# Patient Record
Sex: MALE | Race: OTHER | HISPANIC OR LATINO | URBAN - METROPOLITAN AREA
[De-identification: names, ages, dates, MRNs, and addresses within clinical notes are randomized per-mention and may not be internally consistent; named-entity substitution may affect disease eponyms.]

---

## 2017-07-02 ENCOUNTER — INPATIENT (INPATIENT)
Facility: HOSPITAL | Age: 72
LOS: 2 days | Discharge: HOME | DRG: 309 | End: 2017-07-05
Attending: FAMILY MEDICINE | Admitting: HOSPITALIST
Payer: MEDICARE

## 2017-07-02 VITALS
DIASTOLIC BLOOD PRESSURE: 116 MMHG | OXYGEN SATURATION: 95 % | RESPIRATION RATE: 16 BRPM | TEMPERATURE: 98 F | SYSTOLIC BLOOD PRESSURE: 147 MMHG | HEART RATE: 130 BPM | WEIGHT: 173.94 LBS | HEIGHT: 64 IN

## 2017-07-02 LAB
ALBUMIN SERPL ELPH-MCNC: 4.2 G/DL — SIGNIFICANT CHANGE UP (ref 3.3–5)
ALP SERPL-CCNC: 73 U/L — SIGNIFICANT CHANGE UP (ref 40–120)
ALT FLD-CCNC: 31 U/L — SIGNIFICANT CHANGE UP (ref 12–78)
ANION GAP SERPL CALC-SCNC: 7 MMOL/L — SIGNIFICANT CHANGE UP (ref 5–17)
APTT BLD: 42.7 SEC — HIGH (ref 27.5–37.4)
AST SERPL-CCNC: 30 U/L — SIGNIFICANT CHANGE UP (ref 15–37)
BASOPHILS # BLD AUTO: 0.1 K/UL — SIGNIFICANT CHANGE UP (ref 0–0.2)
BASOPHILS NFR BLD AUTO: 1.1 % — SIGNIFICANT CHANGE UP (ref 0–2)
BILIRUB SERPL-MCNC: 0.7 MG/DL — SIGNIFICANT CHANGE UP (ref 0.2–1.2)
BUN SERPL-MCNC: 23 MG/DL — SIGNIFICANT CHANGE UP (ref 7–23)
CALCIUM SERPL-MCNC: 9.1 MG/DL — SIGNIFICANT CHANGE UP (ref 8.5–10.1)
CHLORIDE SERPL-SCNC: 100 MMOL/L — SIGNIFICANT CHANGE UP (ref 96–108)
CK MB BLD-MCNC: 2 % — SIGNIFICANT CHANGE UP (ref 0–3.5)
CK MB CFR SERPL CALC: 4.2 NG/ML — HIGH (ref 0–3.6)
CK SERPL-CCNC: 207 U/L — SIGNIFICANT CHANGE UP (ref 26–308)
CO2 SERPL-SCNC: 27 MMOL/L — SIGNIFICANT CHANGE UP (ref 22–31)
CREAT SERPL-MCNC: 0.92 MG/DL — SIGNIFICANT CHANGE UP (ref 0.5–1.3)
EOSINOPHIL # BLD AUTO: 0 K/UL — SIGNIFICANT CHANGE UP (ref 0–0.5)
EOSINOPHIL NFR BLD AUTO: 0.5 % — SIGNIFICANT CHANGE UP (ref 0–6)
GLUCOSE SERPL-MCNC: 103 MG/DL — HIGH (ref 70–99)
HCT VFR BLD CALC: 47.9 % — SIGNIFICANT CHANGE UP (ref 39–50)
HGB BLD-MCNC: 16.3 G/DL — SIGNIFICANT CHANGE UP (ref 13–17)
INR BLD: 2.41 RATIO — HIGH (ref 0.88–1.16)
LYMPHOCYTES # BLD AUTO: 0.9 K/UL — LOW (ref 1–3.3)
LYMPHOCYTES # BLD AUTO: 13 % — SIGNIFICANT CHANGE UP (ref 13–44)
MAGNESIUM SERPL-MCNC: 2.1 MG/DL — SIGNIFICANT CHANGE UP (ref 1.6–2.6)
MCHC RBC-ENTMCNC: 29.2 PG — SIGNIFICANT CHANGE UP (ref 27–34)
MCHC RBC-ENTMCNC: 34.1 GM/DL — SIGNIFICANT CHANGE UP (ref 32–36)
MCV RBC AUTO: 85.8 FL — SIGNIFICANT CHANGE UP (ref 80–100)
MONOCYTES # BLD AUTO: 0.8 K/UL — SIGNIFICANT CHANGE UP (ref 0–0.9)
MONOCYTES NFR BLD AUTO: 11.3 % — HIGH (ref 1–9)
NEUTROPHILS # BLD AUTO: 5 K/UL — SIGNIFICANT CHANGE UP (ref 1.8–7.4)
NEUTROPHILS NFR BLD AUTO: 74.1 % — SIGNIFICANT CHANGE UP (ref 43–77)
PLATELET # BLD AUTO: 168 K/UL — SIGNIFICANT CHANGE UP (ref 150–400)
POTASSIUM SERPL-MCNC: 4.2 MMOL/L — SIGNIFICANT CHANGE UP (ref 3.5–5.3)
POTASSIUM SERPL-SCNC: 4.2 MMOL/L — SIGNIFICANT CHANGE UP (ref 3.5–5.3)
PROT SERPL-MCNC: 7.8 G/DL — SIGNIFICANT CHANGE UP (ref 6–8.3)
PROTHROM AB SERPL-ACNC: 26.8 SEC — HIGH (ref 9.8–12.7)
RBC # BLD: 5.58 M/UL — SIGNIFICANT CHANGE UP (ref 4.2–5.8)
RBC # FLD: 12.3 % — SIGNIFICANT CHANGE UP (ref 10.3–14.5)
SODIUM SERPL-SCNC: 134 MMOL/L — LOW (ref 135–145)
TROPONIN I SERPL-MCNC: 0.7 NG/ML — HIGH (ref 0.01–0.04)
WBC # BLD: 6.8 K/UL — SIGNIFICANT CHANGE UP (ref 3.8–10.5)
WBC # FLD AUTO: 6.8 K/UL — SIGNIFICANT CHANGE UP (ref 3.8–10.5)

## 2017-07-02 PROCEDURE — 93010 ELECTROCARDIOGRAM REPORT: CPT

## 2017-07-02 PROCEDURE — 71010: CPT | Mod: 26

## 2017-07-02 RX ORDER — MORPHINE SULFATE 50 MG/1
4 CAPSULE, EXTENDED RELEASE ORAL ONCE
Qty: 0 | Refills: 0 | Status: DISCONTINUED | OUTPATIENT
Start: 2017-07-02 | End: 2017-07-02

## 2017-07-02 RX ORDER — AMIODARONE HYDROCHLORIDE 400 MG/1
150 TABLET ORAL ONCE
Qty: 0 | Refills: 0 | Status: COMPLETED | OUTPATIENT
Start: 2017-07-02 | End: 2017-07-02

## 2017-07-02 RX ADMIN — AMIODARONE HYDROCHLORIDE 618 MILLIGRAM(S): 400 TABLET ORAL at 23:20

## 2017-07-02 NOTE — ED ADULT TRIAGE NOTE - CHIEF COMPLAINT QUOTE
"I felt the jolts from my pacemaker."  pt's pacemaker fired 3 times, pt c/o chest pain and SOB, pt also has numbness to tongue

## 2017-07-02 NOTE — ED PROVIDER NOTE - OBJECTIVE STATEMENT
Pt is a a 70 yo male poor historian. pt with hx cad sp cabg, aicd and afib. pt states had vague left cp tonight and that aicd fired 3x in rapid succession. no syncope, n/v, sweats, dizziness. pt currently co left chest soreness

## 2017-07-02 NOTE — ED ADULT TRIAGE NOTE - LOCATION:
Consent (Nose)/Introductory Paragraph: The rationale for Mohs was explained to the patient and consent was obtained. The risks, benefits and alternatives to therapy were discussed in detail. Specifically, the risks of nasal deformity, changes in the flow of air through the nose, infection, scarring, bleeding, prolonged wound healing, incomplete removal, allergy to anesthesia, nerve injury and recurrence were addressed. Prior to the procedure, the treatment site was clearly identified and confirmed by the patient. All components of Universal Protocol/PAUSE Rule completed. Left arm;

## 2017-07-03 DIAGNOSIS — Z95.810 PRESENCE OF AUTOMATIC (IMPLANTABLE) CARDIAC DEFIBRILLATOR: Chronic | ICD-10-CM

## 2017-07-03 DIAGNOSIS — Z45.02 ENCOUNTER FOR ADJUSTMENT AND MANAGEMENT OF AUTOMATIC IMPLANTABLE CARDIAC DEFIBRILLATOR: ICD-10-CM

## 2017-07-03 DIAGNOSIS — I10 ESSENTIAL (PRIMARY) HYPERTENSION: ICD-10-CM

## 2017-07-03 DIAGNOSIS — I48.91 UNSPECIFIED ATRIAL FIBRILLATION: ICD-10-CM

## 2017-07-03 DIAGNOSIS — E78.5 HYPERLIPIDEMIA, UNSPECIFIED: ICD-10-CM

## 2017-07-03 DIAGNOSIS — I25.10 ATHEROSCLEROTIC HEART DISEASE OF NATIVE CORONARY ARTERY WITHOUT ANGINA PECTORIS: ICD-10-CM

## 2017-07-03 DIAGNOSIS — I49.9 CARDIAC ARRHYTHMIA, UNSPECIFIED: ICD-10-CM

## 2017-07-03 DIAGNOSIS — Z95.1 PRESENCE OF AORTOCORONARY BYPASS GRAFT: Chronic | ICD-10-CM

## 2017-07-03 DIAGNOSIS — Z29.9 ENCOUNTER FOR PROPHYLACTIC MEASURES, UNSPECIFIED: ICD-10-CM

## 2017-07-03 LAB
ANION GAP SERPL CALC-SCNC: 9 MMOL/L — SIGNIFICANT CHANGE UP (ref 5–17)
BUN SERPL-MCNC: 19 MG/DL — SIGNIFICANT CHANGE UP (ref 7–23)
CALCIUM SERPL-MCNC: 8.4 MG/DL — LOW (ref 8.5–10.1)
CHLORIDE SERPL-SCNC: 101 MMOL/L — SIGNIFICANT CHANGE UP (ref 96–108)
CHOLEST SERPL-MCNC: 177 MG/DL — SIGNIFICANT CHANGE UP (ref 10–199)
CK MB BLD-MCNC: 1.1 % — SIGNIFICANT CHANGE UP (ref 0–3.5)
CK MB BLD-MCNC: 1.7 % — SIGNIFICANT CHANGE UP (ref 0–3.5)
CK MB CFR SERPL CALC: 3.4 NG/ML — SIGNIFICANT CHANGE UP (ref 0–3.6)
CK MB CFR SERPL CALC: 4.4 NG/ML — HIGH (ref 0–3.6)
CK SERPL-CCNC: 235 U/L — SIGNIFICANT CHANGE UP (ref 26–308)
CK SERPL-CCNC: 266 U/L — SIGNIFICANT CHANGE UP (ref 26–308)
CK SERPL-CCNC: 306 U/L — SIGNIFICANT CHANGE UP (ref 26–308)
CO2 SERPL-SCNC: 27 MMOL/L — SIGNIFICANT CHANGE UP (ref 22–31)
CREAT SERPL-MCNC: 0.83 MG/DL — SIGNIFICANT CHANGE UP (ref 0.5–1.3)
GLUCOSE SERPL-MCNC: 118 MG/DL — HIGH (ref 70–99)
HCT VFR BLD CALC: 44.9 % — SIGNIFICANT CHANGE UP (ref 39–50)
HDLC SERPL-MCNC: 40 MG/DL — SIGNIFICANT CHANGE UP (ref 40–125)
HGB BLD-MCNC: 15.2 G/DL — SIGNIFICANT CHANGE UP (ref 13–17)
INR BLD: 2.56 RATIO — HIGH (ref 0.88–1.16)
LIPID PNL WITH DIRECT LDL SERPL: 119 MG/DL — SIGNIFICANT CHANGE UP
MAGNESIUM SERPL-MCNC: 2 MG/DL — SIGNIFICANT CHANGE UP (ref 1.6–2.6)
MCHC RBC-ENTMCNC: 29.1 PG — SIGNIFICANT CHANGE UP (ref 27–34)
MCHC RBC-ENTMCNC: 34 GM/DL — SIGNIFICANT CHANGE UP (ref 32–36)
MCV RBC AUTO: 85.7 FL — SIGNIFICANT CHANGE UP (ref 80–100)
PLATELET # BLD AUTO: 146 K/UL — LOW (ref 150–400)
POTASSIUM SERPL-MCNC: 3.6 MMOL/L — SIGNIFICANT CHANGE UP (ref 3.5–5.3)
POTASSIUM SERPL-SCNC: 3.6 MMOL/L — SIGNIFICANT CHANGE UP (ref 3.5–5.3)
PROTHROM AB SERPL-ACNC: 28.4 SEC — HIGH (ref 9.8–12.7)
RBC # BLD: 5.23 M/UL — SIGNIFICANT CHANGE UP (ref 4.2–5.8)
RBC # FLD: 12.5 % — SIGNIFICANT CHANGE UP (ref 10.3–14.5)
SODIUM SERPL-SCNC: 137 MMOL/L — SIGNIFICANT CHANGE UP (ref 135–145)
TOTAL CHOLESTEROL/HDL RATIO MEASUREMENT: 4.4 RATIO — SIGNIFICANT CHANGE UP (ref 3.4–9.6)
TRIGL SERPL-MCNC: 89 MG/DL — SIGNIFICANT CHANGE UP (ref 10–149)
TROPONIN I SERPL-MCNC: 0.21 NG/ML — HIGH (ref 0.01–0.04)
TROPONIN I SERPL-MCNC: 0.45 NG/ML — HIGH (ref 0.01–0.04)
TSH SERPL-MCNC: 2.49 UIU/ML — SIGNIFICANT CHANGE UP (ref 0.36–3.74)
WBC # BLD: 5.7 K/UL — SIGNIFICANT CHANGE UP (ref 3.8–10.5)
WBC # FLD AUTO: 5.7 K/UL — SIGNIFICANT CHANGE UP (ref 3.8–10.5)

## 2017-07-03 PROCEDURE — 99233 SBSQ HOSP IP/OBS HIGH 50: CPT

## 2017-07-03 PROCEDURE — 99285 EMERGENCY DEPT VISIT HI MDM: CPT

## 2017-07-03 PROCEDURE — 99223 1ST HOSP IP/OBS HIGH 75: CPT | Mod: AI,GC

## 2017-07-03 RX ORDER — ASPIRIN/CALCIUM CARB/MAGNESIUM 324 MG
81 TABLET ORAL DAILY
Qty: 0 | Refills: 0 | Status: DISCONTINUED | OUTPATIENT
Start: 2017-07-03 | End: 2017-07-05

## 2017-07-03 RX ORDER — AMIODARONE HYDROCHLORIDE 400 MG/1
200 TABLET ORAL DAILY
Qty: 0 | Refills: 0 | Status: DISCONTINUED | OUTPATIENT
Start: 2017-07-03 | End: 2017-07-04

## 2017-07-03 RX ORDER — ASPIRIN/CALCIUM CARB/MAGNESIUM 324 MG
81 TABLET ORAL DAILY
Qty: 0 | Refills: 0 | Status: DISCONTINUED | OUTPATIENT
Start: 2017-07-03 | End: 2017-07-03

## 2017-07-03 RX ORDER — ISOSORBIDE MONONITRATE 60 MG/1
30 TABLET, EXTENDED RELEASE ORAL DAILY
Qty: 0 | Refills: 0 | Status: DISCONTINUED | OUTPATIENT
Start: 2017-07-03 | End: 2017-07-05

## 2017-07-03 RX ORDER — LOSARTAN POTASSIUM 100 MG/1
25 TABLET, FILM COATED ORAL DAILY
Qty: 0 | Refills: 0 | Status: DISCONTINUED | OUTPATIENT
Start: 2017-07-03 | End: 2017-07-05

## 2017-07-03 RX ORDER — MAGNESIUM OXIDE 400 MG ORAL TABLET 241.3 MG
400 TABLET ORAL ONCE
Qty: 0 | Refills: 0 | Status: COMPLETED | OUTPATIENT
Start: 2017-07-03 | End: 2017-07-03

## 2017-07-03 RX ORDER — METOPROLOL TARTRATE 50 MG
50 TABLET ORAL
Qty: 0 | Refills: 0 | Status: DISCONTINUED | OUTPATIENT
Start: 2017-07-03 | End: 2017-07-05

## 2017-07-03 RX ORDER — FAMOTIDINE 10 MG/ML
20 INJECTION INTRAVENOUS DAILY
Qty: 0 | Refills: 0 | Status: DISCONTINUED | OUTPATIENT
Start: 2017-07-03 | End: 2017-07-05

## 2017-07-03 RX ORDER — MAGNESIUM OXIDE 400 MG ORAL TABLET 241.3 MG
400 TABLET ORAL
Qty: 0 | Refills: 0 | Status: DISCONTINUED | OUTPATIENT
Start: 2017-07-03 | End: 2017-07-05

## 2017-07-03 RX ADMIN — MORPHINE SULFATE 4 MILLIGRAM(S): 50 CAPSULE, EXTENDED RELEASE ORAL at 00:06

## 2017-07-03 RX ADMIN — Medication 81 MILLIGRAM(S): at 11:53

## 2017-07-03 RX ADMIN — MAGNESIUM OXIDE 400 MG ORAL TABLET 400 MILLIGRAM(S): 241.3 TABLET ORAL at 11:53

## 2017-07-03 RX ADMIN — MAGNESIUM OXIDE 400 MG ORAL TABLET 400 MILLIGRAM(S): 241.3 TABLET ORAL at 17:10

## 2017-07-03 RX ADMIN — Medication 50 MILLIGRAM(S): at 02:39

## 2017-07-03 RX ADMIN — MORPHINE SULFATE 4 MILLIGRAM(S): 50 CAPSULE, EXTENDED RELEASE ORAL at 00:12

## 2017-07-03 RX ADMIN — Medication 50 MILLIGRAM(S): at 11:53

## 2017-07-03 RX ADMIN — AMIODARONE HYDROCHLORIDE 200 MILLIGRAM(S): 400 TABLET ORAL at 06:30

## 2017-07-03 RX ADMIN — Medication 50 MILLIGRAM(S): at 06:30

## 2017-07-03 RX ADMIN — Medication 50 MILLIGRAM(S): at 23:53

## 2017-07-03 RX ADMIN — ISOSORBIDE MONONITRATE 30 MILLIGRAM(S): 60 TABLET, EXTENDED RELEASE ORAL at 11:53

## 2017-07-03 RX ADMIN — Medication 1 TABLET(S): at 11:53

## 2017-07-03 RX ADMIN — FAMOTIDINE 20 MILLIGRAM(S): 10 INJECTION INTRAVENOUS at 11:53

## 2017-07-03 RX ADMIN — MAGNESIUM OXIDE 400 MG ORAL TABLET 400 MILLIGRAM(S): 241.3 TABLET ORAL at 02:38

## 2017-07-03 RX ADMIN — Medication 50 MILLIGRAM(S): at 17:09

## 2017-07-03 RX ADMIN — MAGNESIUM OXIDE 400 MG ORAL TABLET 400 MILLIGRAM(S): 241.3 TABLET ORAL at 08:11

## 2017-07-03 NOTE — H&P ADULT - HISTORY OF PRESENT ILLNESS
70 yo M with PMH of CAD s/p CABG, s/p AICD placement in 2015, AFIB on ?Coumadin, HTN, HLD, very poor historian, JEFRY to the ED with complaints of chest pain that began after his AICD fired 3 times earlier tonight. Stated that this has happened previously, most recently last month. He had his AICD interrogated at the time and was unremarkable. Patient stated that he also felt numbness and pain in his Left chest with radiation to his Left arm. Denied fever, chills, palpitations, SOB, cough, abdominal pain, nausea, vomiting, diarrhea, constipation, urinary frequency, urgency, or dysuria, headaches, changes in vision, dizziness, numbness, tingling, recent travel, recent antibiotic use, or sick contacts.    Patient is a very poor historian, as he is unable to provide a medication list, is not completely sure who his PMD or Cardiologist are, and is not able to provide more specifics for his PMH.    In the ED, patient was tachycardic (HR max of 130), hypertensive (/116). EKG showed AFIB with RVR @117bpm. CXR showed. Labs significant for Cardiac enzymes positive x1 (Trop .705, CKMB 4.2); no leukocytosis (WBC 6.8), therapeutic INR (2.41). Received Amiodarone 150mg IV x1, Morphine 4mg IV x1. Evaluated by Dr. Lux in the ED. 70 yo M with PMH of CAD s/p CABG, s/p AICD placement in 2015, AFIB on ?Coumadin, HTN, HLD, very poor historian, JEFRY to the ED with complaints of chest pain that began after his AICD fired 3 times earlier tonight. Stated that this has happened previously, most recently last month. He had his AICD interrogated at the time and was unremarkable. Patient stated that he also felt numbness and pain in his Left chest with radiation to his Left arm. Denied fever, chills, palpitations, SOB, cough, abdominal pain, nausea, vomiting, diarrhea, constipation, urinary frequency, urgency, or dysuria, headaches, changes in vision, dizziness, numbness, tingling, recent travel, recent antibiotic use, or sick contacts.    Patient is a very poor historian, as he is unable to provide a medication list, is not completely sure who his PMD or Cardiologist are, and is not able to provide more specifics for his PMH. Patient stated that [facility] goes to [facility]" (and was adamant that it was not a [facility]).    In the ED, patient was tachycardic (HR max of 130), hypertensive (/116). EKG showed AFIB with RVR @117bpm. CXR showed. Labs significant for Cardiac enzymes positive x1 (Trop .705, CKMB 4.2); no leukocytosis (WBC 6.8), therapeutic INR (2.41). Received Amiodarone 150mg IV x1, Morphine 4mg IV x1. Evaluated by Dr. Lux in the ED.

## 2017-07-03 NOTE — PROGRESS NOTE ADULT - SUBJECTIVE AND OBJECTIVE BOX
70 yo M with PMH of CAD s/p CABG, s/p AICD placement in 2015, AFIB on ?Coumadin, HTN, HLD, very poor historian, BIBEMS to the ED with complaints of chest pain that began after his AICD fired 3 times. Reports left side chest wall. Denies any palpitations, denies shortness of breath.     REVIEW OF SYSTEMS:    CONSTITUTIONAL: No weakness, fevers or chills  EYES/ENT: No visual changes, no throat pain   RESPIRATORY: No cough, wheezing, hemoptysis; No shortness of breath  CARDIOVASCULAR: + left sided chest pain, no palpitations  GASTROINTESTINAL: No abdominal pain, nausea, vomiting, or hematemesis; No diarrhea or constipation. No melena or hematochezia.  GENITOURINARY: No dysuria, frequency or hematuria  NEUROLOGICAL: No dizziness, numbness, or weakness  SKIN: No itching, burning, rashes, or lesions   All other review of systems is negative unless indicated above.      Vital Signs Last 24 Hrs  T(C): 36.2 (03 Jul 2017 08:47), Max: 98.9 (02 Jul 2017 23:30)  T(F): 97.2 (03 Jul 2017 08:47), Max: 210 (02 Jul 2017 23:30)  HR: 74 (03 Jul 2017 08:47) (70 - 130)  BP: 121/79 (03 Jul 2017 08:47) (108/64 - 147/116)  BP(mean): --  RR: 17 (03 Jul 2017 08:47) (16 - 17)  SpO2: 99% (03 Jul 2017 08:47) (95% - 99%)    PHYSICAL EXAM:     GENERAL: no acute distress  HEENT: NC/AT, EOMI, neck supple, MMM  RESPIRATORY: LCTAB/L, no rhonchi, rales, or wheezing  CARDIOVASCULAR: RRR, no murmurs, gallops, rubs  ABDOMINAL: soft, non-tender, non-distended, positive bowel sounds   EXTREMITIES: no clubbing, cyanosis, or edema  NEUROLOGICAL: alert and oriented x 3, non-focal  SKIN: no rashes or lesions   MUSCULOSKELETAL: no gross joint deformity                          15.2   5.7   )-----------( 146      ( 03 Jul 2017 05:56 )             44.9     07-03    137  |  101  |  19  ----------------------------<  118<H>  3.6   |  27  |  0.83    Ca    8.4<L>      03 Jul 2017 05:56  Mg     2.0     07-03    TPro  7.8  /  Alb  4.2  /  TBili  0.7  /  DBili  x   /  AST  30  /  ALT  31  /  AlkPhos  73  07-02      PT/INR - ( 03 Jul 2017 05:56 )   PT: 28.4 sec;   INR: 2.56 ratio         PTT - ( 02 Jul 2017 22:21 )  PTT:42.7 sec      Troponin I, Serum #1: .705  Troponin I, Serum #2: .453        MEDICATIONS  (STANDING):  metoprolol 50 milliGRAM(s) Oral four times a day  amiodarone    Tablet 200 milliGRAM(s) Oral daily  famotidine    Tablet 20 milliGRAM(s) Oral daily  multivitamin 1 Tablet(s) Oral daily  isosorbide   mononitrate ER Tablet (IMDUR) 30 milliGRAM(s) Oral daily  losartan 25 milliGRAM(s) Oral daily  aspirin enteric coated 81 milliGRAM(s) Oral daily  magnesium oxide 400 milliGRAM(s) Oral three times a day with meals

## 2017-07-03 NOTE — H&P ADULT - ATTENDING COMMENTS
72 yo M with PMH of CAD s/p CABG, s/p AICD placement in 2015, AFIB on ?Coumadin, HTN, HLD admitted to Tele for Defibrillator discharge, cardiac arrythmia, rapid AFib, evaluate for ACS.  Cardio consulted.  Will do full interrogation of AICD.  Trend troponins.

## 2017-07-03 NOTE — H&P ADULT - PROBLEM SELECTOR PLAN 7
-Per Cardio, no VTE ppx at this time as patient is already therapeutically anticoagulated. -Stable.  -Continue with ASA.

## 2017-07-03 NOTE — H&P ADULT - PROBLEM SELECTOR PLAN 6
-Stable.  -Continue with ASA. -Initially hypertensive, now stable.  -Continue with Metoprolol and Losartan with hold parameters.  -Monitor BP.

## 2017-07-03 NOTE — H&P ADULT - PROBLEM SELECTOR PLAN 2
-Admit to Tele.  -Rate now controlled, HR 83 at bedside.  -Continue with Amiodarone, Imdur, and Cardizem prn with hold parameters.  -Appreciate Cardio recommendations.

## 2017-07-03 NOTE — H&P ADULT - PROBLEM SELECTOR PLAN 4
-Stable.  -Continue with ASA. -Cardiac enzymes positive x1 (Trop .705, CKMB 4.2).  -Will trend x2 q8h.  -Follow up Lipid Profile.  -Follow up TTE.  -Appreciate Cardio recommendations.

## 2017-07-03 NOTE — H&P ADULT - PROBLEM SELECTOR PLAN 5
-Initially hypertensive, now stable.  -Continue with Metoprolol and Losartan with hold parameters.  -Monitor BP. -Stable.  -Continue with ASA.

## 2017-07-03 NOTE — PROGRESS NOTE ADULT - ASSESSMENT
70 yo M with PMH of CAD s/p CABG, s/p AICD placement in 2015, AFIB on ?Coumadin, HTN, HLD, very poor historian, admitted to telemetry for defibrillator discharge, rapid AFib, evaluate for ACS.

## 2017-07-03 NOTE — H&P ADULT - PROBLEM SELECTOR PLAN 1
-Admit to Tele.  -s/p 3 consecutive discharges per patient.  -Patient to have AICD interrogated tomorrow by Cardio.  -Appreciate Cardio recommendations.  -Please confirm patient's PMD, medication rec, and pharmacy again tomorrow. -Admit to Tele.  -s/p 3 consecutive discharges per patient.  -Patient to have AICD interrogated tomorrow by Cardio.  -Appreciate Cardio recommendations.  -Please confirm patient's PMD([phone]), medication rec, and pharmacy again tomorrow.

## 2017-07-03 NOTE — H&P ADULT - NSHPREVIEWOFSYSTEMS_GEN_ALL_CORE
Constitutional: denies fever, chills, diaphoresis   HEENT: denies blurry vision, difficulty hearing  Respiratory: denies SOB, GAINES, cough, sputum production, wheezing, hemoptysis  Cardiovascular: reports chest pain, denies palpitations, edema  Gastrointestinal: denies nausea, vomiting, diarrhea, constipation, abdominal pain  Genitourinary: denies dysuria, frequency, urgency, hematuria   Skin/Breast: denies rash, itching  Musculoskeletal: denies myalgias, joint swelling, muscle weakness  Neurologic: reports LUE numbness, denies headache, weakness, dizziness, paresthesias, tingling  Psychiatric: denies feeling anxious, depressed, suicidal, homicidal thoughts  Hematology/Oncology: denies bruising, tender or enlarged lymph nodes

## 2017-07-03 NOTE — PROGRESS NOTE ADULT - PROBLEM SELECTOR PLAN 7
Not on medications reportedly. Will attempt to confirm with pharmacy.   Considering risk factor profile, should be on a statin if no contraindiction.

## 2017-07-03 NOTE — H&P ADULT - ASSESSMENT
72 yo M with PMH of CAD s/p CABG, s/p AICD placement in 2015, AFIB on ?Coumadin, HTN, HLD, very poor historian, admitted to Tele for Defibrillator discharge, rapid AFib, evaluate for ACS.

## 2017-07-03 NOTE — H&P ADULT - PROBLEM SELECTOR PLAN 3
-Cardiac enzymes positive x1 (Trop .705, CKMB 4.2).  -Will trend x2 q8h.  -Follow up Lipid Profile.  -Follow up TTE.  -Appreciate Cardio recommendations. -Admit to Tele.  -Rate now controlled, HR 83 at bedside.  -Continue with Amiodarone, Imdur, and Cardizem prn with hold parameters.  -Appreciate Cardio recommendations.

## 2017-07-03 NOTE — H&P ADULT - NSHPSOCIALHISTORY_GEN_ALL_CORE
Social History:    Marital Status: Single  Occupation: Retired,   Lives with: Alone  Ambulates at home: Without assistive devices    Substance Use:  Tobacco Usage: Denied  Alcohol Usage: Denied  Illicit Drug Usage: Denied    Health Management     Last physical exam: Within last year, wnl per patient    Immunization Hx: Patient unsure if he received Flu, Pneumonia, Tetanus, Hepatitis vaccines

## 2017-07-03 NOTE — H&P ADULT - NSHPPHYSICALEXAM_GEN_ALL_CORE
Vital Signs Last 24 Hrs  T(C): 98.9 (02 Jul 2017 23:30), Max: 98.9 (02 Jul 2017 23:30)  T(F): 210 (02 Jul 2017 23:30), Max: 210 (02 Jul 2017 23:30)  HR: 108 (02 Jul 2017 23:30) (108 - 130)  BP: 138/90 (02 Jul 2017 23:30) (138/90 - 147/116)  RR: 16 (02 Jul 2017 23:30) (16 - 16)  SpO2: 97% (02 Jul 2017 23:30) (95% - 97%)    General: Well developed, NAD  HEENT: Moist mucous membranes   Neck: Supple, nontender, no mass  Neurology: A&Ox3, sensation intact   Respiratory: CTA B/L, No W/R/R  CV: Irregularly irregular rate and rhythm, +S1/S2, no murmurs  Abdominal: Soft, NT, ND +BSx4  Extremities: No LE edema, + peripheral pulses  MSK: Normal ROM, no joint erythema or warmth, no joint swelling   Skin: warm, dry, normal color, no rash or abnormal lesions

## 2017-07-03 NOTE — H&P ADULT - NSHPOUTPATIENTPROVIDERS_GEN_ALL_CORE
Dr. Azar Velazco (PMD, unsure if spelled correctly) Dr. Mireya Velazco (PMD, unsure if spelled correctly) PCP telephone is [phone]

## 2017-07-03 NOTE — H&P ADULT - PMH
Atrial fibrillation, unspecified type    Coronary artery disease    HLD (hyperlipidemia)    HTN (hypertension)

## 2017-07-04 LAB
ANION GAP SERPL CALC-SCNC: 4 MMOL/L — LOW (ref 5–17)
BUN SERPL-MCNC: 17 MG/DL — SIGNIFICANT CHANGE UP (ref 7–23)
CALCIUM SERPL-MCNC: 8.2 MG/DL — LOW (ref 8.5–10.1)
CHLORIDE SERPL-SCNC: 104 MMOL/L — SIGNIFICANT CHANGE UP (ref 96–108)
CO2 SERPL-SCNC: 32 MMOL/L — HIGH (ref 22–31)
CREAT SERPL-MCNC: 0.76 MG/DL — SIGNIFICANT CHANGE UP (ref 0.5–1.3)
GLUCOSE SERPL-MCNC: 89 MG/DL — SIGNIFICANT CHANGE UP (ref 70–99)
HCT VFR BLD CALC: 41.9 % — SIGNIFICANT CHANGE UP (ref 39–50)
HGB BLD-MCNC: 14.3 G/DL — SIGNIFICANT CHANGE UP (ref 13–17)
INR BLD: 2.58 RATIO — HIGH (ref 0.88–1.16)
MCHC RBC-ENTMCNC: 29.5 PG — SIGNIFICANT CHANGE UP (ref 27–34)
MCHC RBC-ENTMCNC: 34.1 GM/DL — SIGNIFICANT CHANGE UP (ref 32–36)
MCV RBC AUTO: 86.5 FL — SIGNIFICANT CHANGE UP (ref 80–100)
PLATELET # BLD AUTO: 135 K/UL — LOW (ref 150–400)
POTASSIUM SERPL-MCNC: 3.9 MMOL/L — SIGNIFICANT CHANGE UP (ref 3.5–5.3)
POTASSIUM SERPL-SCNC: 3.9 MMOL/L — SIGNIFICANT CHANGE UP (ref 3.5–5.3)
PROTHROM AB SERPL-ACNC: 28.7 SEC — HIGH (ref 9.8–12.7)
RBC # BLD: 4.85 M/UL — SIGNIFICANT CHANGE UP (ref 4.2–5.8)
RBC # FLD: 12.5 % — SIGNIFICANT CHANGE UP (ref 10.3–14.5)
SODIUM SERPL-SCNC: 140 MMOL/L — SIGNIFICANT CHANGE UP (ref 135–145)
WBC # BLD: 4.6 K/UL — SIGNIFICANT CHANGE UP (ref 3.8–10.5)
WBC # FLD AUTO: 4.6 K/UL — SIGNIFICANT CHANGE UP (ref 3.8–10.5)

## 2017-07-04 PROCEDURE — 99233 SBSQ HOSP IP/OBS HIGH 50: CPT

## 2017-07-04 RX ORDER — AMIODARONE HYDROCHLORIDE 400 MG/1
200 TABLET ORAL
Qty: 0 | Refills: 0 | Status: DISCONTINUED | OUTPATIENT
Start: 2017-07-04 | End: 2017-07-04

## 2017-07-04 RX ORDER — ATORVASTATIN CALCIUM 80 MG/1
20 TABLET, FILM COATED ORAL AT BEDTIME
Qty: 0 | Refills: 0 | Status: DISCONTINUED | OUTPATIENT
Start: 2017-07-04 | End: 2017-07-05

## 2017-07-04 RX ORDER — WARFARIN SODIUM 2.5 MG/1
2.5 TABLET ORAL ONCE
Qty: 0 | Refills: 0 | Status: COMPLETED | OUTPATIENT
Start: 2017-07-04 | End: 2017-07-04

## 2017-07-04 RX ORDER — AMIODARONE HYDROCHLORIDE 400 MG/1
400 TABLET ORAL
Qty: 0 | Refills: 0 | Status: COMPLETED | OUTPATIENT
Start: 2017-07-04 | End: 2017-07-05

## 2017-07-04 RX ADMIN — MAGNESIUM OXIDE 400 MG ORAL TABLET 400 MILLIGRAM(S): 241.3 TABLET ORAL at 08:15

## 2017-07-04 RX ADMIN — AMIODARONE HYDROCHLORIDE 200 MILLIGRAM(S): 400 TABLET ORAL at 05:14

## 2017-07-04 RX ADMIN — MAGNESIUM OXIDE 400 MG ORAL TABLET 400 MILLIGRAM(S): 241.3 TABLET ORAL at 17:10

## 2017-07-04 RX ADMIN — ATORVASTATIN CALCIUM 20 MILLIGRAM(S): 80 TABLET, FILM COATED ORAL at 21:45

## 2017-07-04 RX ADMIN — Medication 50 MILLIGRAM(S): at 17:10

## 2017-07-04 RX ADMIN — AMIODARONE HYDROCHLORIDE 400 MILLIGRAM(S): 400 TABLET ORAL at 13:49

## 2017-07-04 RX ADMIN — ISOSORBIDE MONONITRATE 30 MILLIGRAM(S): 60 TABLET, EXTENDED RELEASE ORAL at 12:23

## 2017-07-04 RX ADMIN — Medication 1 TABLET(S): at 12:23

## 2017-07-04 RX ADMIN — Medication 81 MILLIGRAM(S): at 12:23

## 2017-07-04 RX ADMIN — WARFARIN SODIUM 2.5 MILLIGRAM(S): 2.5 TABLET ORAL at 21:45

## 2017-07-04 RX ADMIN — MAGNESIUM OXIDE 400 MG ORAL TABLET 400 MILLIGRAM(S): 241.3 TABLET ORAL at 12:23

## 2017-07-04 RX ADMIN — AMIODARONE HYDROCHLORIDE 200 MILLIGRAM(S): 400 TABLET ORAL at 13:22

## 2017-07-04 RX ADMIN — FAMOTIDINE 20 MILLIGRAM(S): 10 INJECTION INTRAVENOUS at 12:22

## 2017-07-04 RX ADMIN — Medication 50 MILLIGRAM(S): at 12:22

## 2017-07-04 NOTE — PROGRESS NOTE ADULT - PROBLEM SELECTOR PLAN 3
-Rate now controlled.  -Continue with Amiodarone, Imdur, and Cardizem prn with hold parameters.  -Cardio: Lux following.

## 2017-07-04 NOTE — PROGRESS NOTE ADULT - SUBJECTIVE AND OBJECTIVE BOX
[facility]       HPI:  72 yo M with PMH of CAD s/p CABG, s/p AICD placement in 2015, AFIB on ?Coumadin, HTN, HLD, very poor historian, JEFRY to the ED with complaints of chest pain that began after his AICD fired 3 times earlier tonight. Stated that this has happened previously, most recently last month. He had his AICD interrogated at the time and was unremarkable. Patient stated that he also felt numbness and pain in his Left chest with radiation to his Left arm. Denied fever, chills, palpitations, SOB, cough, abdominal pain, nausea, vomiting, diarrhea, constipation, urinary frequency, urgency, or dysuria, headaches, changes in vision, dizziness, numbness, tingling, recent travel, recent antibiotic use, or sick contacts.    Patient is a very poor historian, as he is unable to provide a medication list, is not completely sure who his PMD or Cardiologist are, and is not able to provide more specifics for his PMH. Patient stated that [facility] goes to [facility]" (and was adamant that it was not a [facility]).    In the ED, patient was tachycardic (HR max of 130), hypertensive (/116). EKG showed AFIB with RVR @117bpm. CXR showed. Labs significant for Cardiac enzymes positive x1 (Trop .705, CKMB 4.2); no leukocytosis (WBC 6.8), therapeutic INR (2.41). Received Amiodarone 150mg IV x1, Morphine 4mg IV x1. Evaluated by Dr. Lux in the ED. (03 Jul 2017 02:06)        SUBJECTIVE:      ALLERGIES:  Allergies    No Known Allergies    Intolerances          MEDICATIONS  (STANDING):  metoprolol 50 milliGRAM(s) Oral four times a day  famotidine    Tablet 20 milliGRAM(s) Oral daily  multivitamin 1 Tablet(s) Oral daily  isosorbide   mononitrate ER Tablet (IMDUR) 30 milliGRAM(s) Oral daily  losartan 25 milliGRAM(s) Oral daily  aspirin enteric coated 81 milliGRAM(s) Oral daily  magnesium oxide 400 milliGRAM(s) Oral three times a day with meals  atorvastatin 20 milliGRAM(s) Oral at bedtime  amiodarone    Tablet 400 milliGRAM(s) Oral two times a day  warfarin 2.5 milliGRAM(s) Oral once    MEDICATIONS  (PRN):  diltiazem Injectable 10 milliGRAM(s) IV Push every 2 hours PRN for heart ernie >120      REVIEW OF SYSTEMS:  CONSTITUTIONAL: No fever,  RESPIRATORY: No cough, wheezing, shortness of breath  CARDIOVASCULAR: No chest pain, dyspnea, palpitations, dizziness, syncope, paroxysmal nocturnal dyspnea, orthopnea, or arm or leg swelling  GASTROINTESTINAL: No abdominal  or epigastric pain, nausea, vomiting,  diarrhea  NEUROLOGICAL: No headaches,  loss of strength, numbness, or tremors    Vital Signs Last 24 Hrs  T(C): 36.3 (04 Jul 2017 07:06), Max: 36.5 (03 Jul 2017 19:51)  T(F): 97.4 (04 Jul 2017 07:06), Max: 97.7 (03 Jul 2017 19:51)  HR: 63 (04 Jul 2017 07:06) (60 - 72)  BP: 106/70 (04 Jul 2017 07:06) (96/54 - 123/83)  BP(mean): --  RR: 17 (04 Jul 2017 07:06) (16 - 17)  SpO2: 98% (04 Jul 2017 07:06) (98% - 99%)    PHYSICAL EXAM:  HEAD:  Atraumatic, Normocephalic  NECK: Supple and normal thyroid.  No JVD or carotid bruit.   HEART: S1, S2 regular , 1/6 soft ejection systolic murmur in mitral area , no thrill and no gallops .  PULMONARY: Bilateral vesicular breathing , few scattered ronchi and few scattered rales are present .  ABDOMEN: Soft nontender and positive bowl sounds   EXTREMITIES:  No clubbing, cyanosis, or pedal  edema  NEUROLOGICAL: AAOX3 , no focal deficit .    LABS:                        14.3   4.6   )-----------( 135      ( 04 Jul 2017 05:35 )             41.9     07-04    140  |  104  |  17  ----------------------------<  89  3.9   |  32<H>  |  0.76    Ca    8.2<L>      04 Jul 2017 05:35  Phos  2.7     07-04  Mg     2.0     07-04    TPro  7.8  /  Alb  4.2  /  TBili  0.7  /  DBili  x   /  AST  30  /  ALT  31  /  AlkPhos  73  07-02    CARDIAC MARKERS ( 03 Jul 2017 13:34 )  .208 ng/mL / x     / 306 U/L / x     / 3.4 ng/mL  CARDIAC MARKERS ( 03 Jul 2017 05:56 )  .453 ng/mL / x     / 266 U/L / x     / 4.4 ng/mL  CARDIAC MARKERS ( 03 Jul 2017 02:31 )  x     / x     / 235 U/L / x     / x      CARDIAC MARKERS ( 02 Jul 2017 22:21 )  .705 ng/mL / x     / 207 U/L / x     / 4.2 ng/mL      PT/INR - ( 04 Jul 2017 05:35 )   PT: 28.7 sec;   INR: 2.58 ratio         PTT - ( 02 Jul 2017 22:21 )  PTT:42.7 sec    BNP      EKG:  ECHO:  IMAGING:    Assessment/Plan  ADDENDUM TO MY TODAY PROGRESS NOTE WRITTEN EARLIER .   Will give amiodarone 400 mg po today and tomorrow AND then discharge home tomorrow on amiodarone 200 mg po twice a day   If stable , patient can go home tomorrow .  Will continue to follow during hospital course and give further recommendations as needed . Thanks for your referral . if any questions please contact me at office ([phone] [phone])

## 2017-07-04 NOTE — PROGRESS NOTE ADULT - ASSESSMENT
72 yo M with PMH of CAD s/p CABG, s/p AICD placement in 2015, AFIB on ?Coumadin, HTN, HLD, very poor historian, admitted to telemetry for defibrillator discharge, rapid AFib, r/o ACS.

## 2017-07-04 NOTE — PROGRESS NOTE ADULT - SUBJECTIVE AND OBJECTIVE BOX
[facility]       HPI:  70 yo M with PMH of CAD s/p CABG, s/p AICD placement in 2015, AFIB on ?Coumadin, HTN, HLD, very poor historian, JEFRY to the ED with complaints of chest pain that began after his AICD fired 3 times earlier tonight. Stated that this has happened previously, most recently last month. He had his AICD interrogated at the time and was unremarkable. Patient stated that he also felt numbness and pain in his Left chest with radiation to his Left arm. Denied fever, chills, palpitations, SOB, cough, abdominal pain, nausea, vomiting, diarrhea, constipation, urinary frequency, urgency, or dysuria, headaches, changes in vision, dizziness, numbness, tingling, recent travel, recent antibiotic use, or sick contacts.    Patient is a very poor historian, as he is unable to provide a medication list, is not completely sure who his PMD or Cardiologist are, and is not able to provide more specifics for his PMH. Patient stated that [facility] goes to [facility] (and was adamant that it was not a [facility]).    In the ED, patient was tachycardic (HR max of 130), hypertensive (/116). EKG showed AFIB with RVR @117bpm. CXR showed. Labs significant for Cardiac enzymes positive x1 (Trop .705, CKMB 4.2); no leukocytosis (WBC 6.8), therapeutic INR (2.41). Received Amiodarone 150mg IV x1, Morphine 4mg IV x1. Evaluated by Dr. Lux in the ED. (03 Jul 2017 02:06)        SUBJECTIVE: Patient lying comfortable . Patient ambulating on floor .      ALLERGIES:  Allergies    No Known Allergies    Intolerances          MEDICATIONS  (STANDING):  metoprolol 50 milliGRAM(s) Oral four times a day  amiodarone    Tablet 200 milliGRAM(s) Oral daily  famotidine    Tablet 20 milliGRAM(s) Oral daily  multivitamin 1 Tablet(s) Oral daily  isosorbide   mononitrate ER Tablet (IMDUR) 30 milliGRAM(s) Oral daily  losartan 25 milliGRAM(s) Oral daily  aspirin enteric coated 81 milliGRAM(s) Oral daily  magnesium oxide 400 milliGRAM(s) Oral three times a day with meals  atorvastatin 20 milliGRAM(s) Oral at bedtime    MEDICATIONS  (PRN):  diltiazem Injectable 10 milliGRAM(s) IV Push every 2 hours PRN for heart ernie >120      REVIEW OF SYSTEMS:  CONSTITUTIONAL: No fever,  RESPIRATORY: No cough, wheezing, shortness of breath  CARDIOVASCULAR: No chest pain, dyspnea, palpitations, dizziness, syncope, paroxysmal nocturnal dyspnea, orthopnea, or arm or leg swelling  GASTROINTESTINAL: No abdominal  or epigastric pain, nausea, vomiting,  diarrhea  NEUROLOGICAL: No headaches,  loss of strength, numbness, or tremors    Vital Signs Last 24 Hrs  T(C): 36.3 (04 Jul 2017 07:06), Max: 36.5 (03 Jul 2017 19:51)  T(F): 97.4 (04 Jul 2017 07:06), Max: 97.7 (03 Jul 2017 19:51)  HR: 63 (04 Jul 2017 07:06) (60 - 72)  BP: 106/70 (04 Jul 2017 07:06) (96/54 - 123/83)  BP(mean): --  RR: 17 (04 Jul 2017 07:06) (16 - 17)  SpO2: 98% (04 Jul 2017 07:06) (98% - 99%)    PHYSICAL EXAM:  HEAD:  Atraumatic, Normocephalic  NECK: Supple and normal thyroid.  No JVD or carotid bruit.   HEART: S1, S2 regular , 1/6 soft ejection systolic murmur in mitral area , no thrill and no gallops .  PULMONARY: Bilateral vesicular breathing , few scattered ronchi and few scattered rales are present .  ABDOMEN: Soft nontender and positive bowl sounds   EXTREMITIES:  No clubbing, cyanosis, or pedal  edema  NEUROLOGICAL: AAOX3 , no focal deficit .    LABS:                        14.3   4.6   )-----------( 135      ( 04 Jul 2017 05:35 )             41.9     07-04    140  |  104  |  17  ----------------------------<  89  3.9   |  32<H>  |  0.76    Ca    8.2<L>      04 Jul 2017 05:35  Phos  2.7     07-04  Mg     2.0     07-04    TPro  7.8  /  Alb  4.2  /  TBili  0.7  /  DBili  x   /  AST  30  /  ALT  31  /  AlkPhos  73  07-02    CARDIAC MARKERS ( 03 Jul 2017 13:34 )  .208 ng/mL / x     / 306 U/L / x     / 3.4 ng/mL  CARDIAC MARKERS ( 03 Jul 2017 05:56 )  .453 ng/mL / x     / 266 U/L / x     / 4.4 ng/mL  CARDIAC MARKERS ( 03 Jul 2017 02:31 )  x     / x     / 235 U/L / x     / x      CARDIAC MARKERS ( 02 Jul 2017 22:21 )  .705 ng/mL / x     / 207 U/L / x     / 4.2 ng/mL      PT/INR - ( 04 Jul 2017 05:35 )   PT: 28.7 sec;   INR: 2.58 ratio         PTT - ( 02 Jul 2017 22:21 )  PTT:42.7 sec            ECHO: < from: TTE Echo Doppler w/o Cont (07.04.17 @ 10:19) >  Normal LV size with mild depressed LV systolic function.    Mild mitral regurgitation is present.    LV diastolic function cannot evaluate because of underlying  A. fib    Mild to moderate tricuspid regurgitation is present. No evidence of any   pulmonary hypertension.    Correlate clinically above findings.                Assessment/Plan  Patient has :  1) S/P AICD shock for V tach episode .   2) Mild elevated Troponin I secondary to shock by AICD and not a primary NON-STEMI event .  3) CHF secondary to chronic  LV systolic and diastolic dysfunction and stable .  4) Atrial fibrillation and stable .  5) H/O CAD .   Plan : 1) Stable cardiac wise and can go home on present medications . 2) Cardiology F/U with patient cardiologist or with me 1 week [id number] 3 ) Continue present medications .  Will continue to follow during hospital course and give further recommendations as needed . Thanks for your referral . if any questions please contact me at office ([phone] [phone])

## 2017-07-04 NOTE — PROGRESS NOTE ADULT - SUBJECTIVE AND OBJECTIVE BOX
70 yo M with PMH of CAD s/p CABG, s/p AICD placement in 2015, AFIB on ?Coumadin, HTN, HLD, very poor historian, BIBEMS to the ED with complaints of chest pain that began after his AICD fired 3 times. Left chest wall pain has improved. Denies shortness of breath, palpitations, N/V/D.     REVIEW OF SYSTEMS:    CONSTITUTIONAL: No weakness, fevers or chills  EYES/ENT: No visual changes, no throat pain   RESPIRATORY: No cough, wheezing, hemoptysis; No shortness of breath  CARDIOVASCULAR: No chest pain, no palpitations  GASTROINTESTINAL: No abdominal pain, nausea, vomiting, or hematemesis; No diarrhea or constipation. No melena or hematochezia.  GENITOURINARY: No dysuria, frequency or hematuria  NEUROLOGICAL: No dizziness, numbness, or weakness  SKIN: No itching, burning, rashes, or lesions   All other review of systems is negative unless indicated above.      Vital Signs Last 24 Hrs  T(C): 36.3 (04 Jul 2017 07:06), Max: 37.2 (03 Jul 2017 12:45)  T(F): 97.4 (04 Jul 2017 07:06), Max: 99 (03 Jul 2017 12:45)  HR: 63 (04 Jul 2017 07:06) (60 - 72)  BP: 106/70 (04 Jul 2017 07:06) (96/54 - 123/83)  BP(mean): --  RR: 17 (04 Jul 2017 07:06) (16 - 17)  SpO2: 98% (04 Jul 2017 07:06) (98% - 99%)    PHYSICAL EXAM:     GENERAL: no acute distress  HEENT: NC/AT, EOMI, neck supple, MMM  RESPIRATORY: LCTAB/L, no rhonchi, rales, or wheezing  CARDIOVASCULAR: irregular, no murmurs, gallops, rubs  ABDOMINAL: soft, non-tender, non-distended, positive bowel sounds   EXTREMITIES: no clubbing, cyanosis, or edema  NEUROLOGICAL: alert and oriented x 3, non-focal  SKIN: no rashes or lesions   MUSCULOSKELETAL: no gross joint deformity    LABS                        14.3   4.6   )-----------( 135      ( 04 Jul 2017 05:35 )             41.9   07-04    140  |  104  |  17  ----------------------------<  89  3.9   |  32<H>  |  0.76    Ca    8.2<L>      04 Jul 2017 05:35  Phos  2.7     07-04  Mg     2.0     07-04    TPro  7.8  /  Alb  4.2  /  TBili  0.7  /  DBili  x   /  AST  30  /  ALT  31  /  AlkPhos  73  07-02    PT/INR - ( 04 Jul 2017 05:35 )   PT: 28.7 sec;   INR: 2.58 ratio           Troponin I, Serum #1: .705  Troponin I, Serum #2: .453      MEDICATIONS  (STANDING):  metoprolol 50 milliGRAM(s) Oral four times a day  amiodarone    Tablet 200 milliGRAM(s) Oral daily  famotidine    Tablet 20 milliGRAM(s) Oral daily  multivitamin 1 Tablet(s) Oral daily  isosorbide   mononitrate ER Tablet (IMDUR) 30 milliGRAM(s) Oral daily  losartan 25 milliGRAM(s) Oral daily  aspirin enteric coated 81 milliGRAM(s) Oral daily  magnesium oxide 400 milliGRAM(s) Oral three times a day with meals    MEDICATIONS  (PRN):  diltiazem Injectable 10 milliGRAM(s) IV Push every 2 hours PRN for heart ernie >120

## 2017-07-05 VITALS
TEMPERATURE: 98 F | DIASTOLIC BLOOD PRESSURE: 62 MMHG | SYSTOLIC BLOOD PRESSURE: 125 MMHG | RESPIRATION RATE: 18 BRPM | HEART RATE: 85 BPM | OXYGEN SATURATION: 100 %

## 2017-07-05 LAB
ANION GAP SERPL CALC-SCNC: 7 MMOL/L — SIGNIFICANT CHANGE UP (ref 5–17)
BUN SERPL-MCNC: 20 MG/DL — SIGNIFICANT CHANGE UP (ref 7–23)
CALCIUM SERPL-MCNC: 8.2 MG/DL — LOW (ref 8.5–10.1)
CHLORIDE SERPL-SCNC: 106 MMOL/L — SIGNIFICANT CHANGE UP (ref 96–108)
CO2 SERPL-SCNC: 29 MMOL/L — SIGNIFICANT CHANGE UP (ref 22–31)
CREAT SERPL-MCNC: 0.72 MG/DL — SIGNIFICANT CHANGE UP (ref 0.5–1.3)
GLUCOSE SERPL-MCNC: 84 MG/DL — SIGNIFICANT CHANGE UP (ref 70–99)
HCT VFR BLD CALC: 42 % — SIGNIFICANT CHANGE UP (ref 39–50)
HGB BLD-MCNC: 14.2 G/DL — SIGNIFICANT CHANGE UP (ref 13–17)
INR BLD: 1.66 RATIO — HIGH (ref 0.88–1.16)
MCHC RBC-ENTMCNC: 29.2 PG — SIGNIFICANT CHANGE UP (ref 27–34)
MCHC RBC-ENTMCNC: 33.7 GM/DL — SIGNIFICANT CHANGE UP (ref 32–36)
MCV RBC AUTO: 86.6 FL — SIGNIFICANT CHANGE UP (ref 80–100)
PLATELET # BLD AUTO: 136 K/UL — LOW (ref 150–400)
POTASSIUM SERPL-MCNC: 3.8 MMOL/L — SIGNIFICANT CHANGE UP (ref 3.5–5.3)
POTASSIUM SERPL-SCNC: 3.8 MMOL/L — SIGNIFICANT CHANGE UP (ref 3.5–5.3)
PROTHROM AB SERPL-ACNC: 18.3 SEC — HIGH (ref 9.8–12.7)
RBC # BLD: 4.85 M/UL — SIGNIFICANT CHANGE UP (ref 4.2–5.8)
RBC # FLD: 12.6 % — SIGNIFICANT CHANGE UP (ref 10.3–14.5)
SODIUM SERPL-SCNC: 142 MMOL/L — SIGNIFICANT CHANGE UP (ref 135–145)
WBC # BLD: 4.8 K/UL — SIGNIFICANT CHANGE UP (ref 3.8–10.5)
WBC # FLD AUTO: 4.8 K/UL — SIGNIFICANT CHANGE UP (ref 3.8–10.5)

## 2017-07-05 PROCEDURE — 85730 THROMBOPLASTIN TIME PARTIAL: CPT

## 2017-07-05 PROCEDURE — 82550 ASSAY OF CK (CPK): CPT

## 2017-07-05 PROCEDURE — 80048 BASIC METABOLIC PNL TOTAL CA: CPT

## 2017-07-05 PROCEDURE — 84100 ASSAY OF PHOSPHORUS: CPT

## 2017-07-05 PROCEDURE — 36415 COLL VENOUS BLD VENIPUNCTURE: CPT

## 2017-07-05 PROCEDURE — 99285 EMERGENCY DEPT VISIT HI MDM: CPT | Mod: 25

## 2017-07-05 PROCEDURE — 80061 LIPID PANEL: CPT

## 2017-07-05 PROCEDURE — 71045 X-RAY EXAM CHEST 1 VIEW: CPT

## 2017-07-05 PROCEDURE — 85610 PROTHROMBIN TIME: CPT

## 2017-07-05 PROCEDURE — 82553 CREATINE MB FRACTION: CPT

## 2017-07-05 PROCEDURE — 84484 ASSAY OF TROPONIN QUANT: CPT

## 2017-07-05 PROCEDURE — 84443 ASSAY THYROID STIM HORMONE: CPT

## 2017-07-05 PROCEDURE — 83735 ASSAY OF MAGNESIUM: CPT

## 2017-07-05 PROCEDURE — 85027 COMPLETE CBC AUTOMATED: CPT

## 2017-07-05 PROCEDURE — 93005 ELECTROCARDIOGRAM TRACING: CPT

## 2017-07-05 PROCEDURE — 80053 COMPREHEN METABOLIC PANEL: CPT

## 2017-07-05 PROCEDURE — 93306 TTE W/DOPPLER COMPLETE: CPT

## 2017-07-05 PROCEDURE — 99239 HOSP IP/OBS DSCHRG MGMT >30: CPT

## 2017-07-05 RX ORDER — WARFARIN SODIUM 2.5 MG/1
5 TABLET ORAL ONCE
Qty: 0 | Refills: 0 | Status: COMPLETED | OUTPATIENT
Start: 2017-07-05 | End: 2017-07-05

## 2017-07-05 RX ORDER — ASPIRIN/CALCIUM CARB/MAGNESIUM 324 MG
1 TABLET ORAL
Qty: 30 | Refills: 0 | OUTPATIENT
Start: 2017-07-05 | End: 2017-08-04

## 2017-07-05 RX ORDER — METOPROLOL TARTRATE 50 MG
100 TABLET ORAL
Qty: 0 | Refills: 0 | Status: DISCONTINUED | OUTPATIENT
Start: 2017-07-05 | End: 2017-07-05

## 2017-07-05 RX ORDER — WARFARIN SODIUM 2.5 MG/1
1 TABLET ORAL
Qty: 30 | Refills: 0 | OUTPATIENT
Start: 2017-07-05 | End: 2017-08-04

## 2017-07-05 RX ORDER — LOSARTAN POTASSIUM 100 MG/1
12.5 TABLET, FILM COATED ORAL DAILY
Qty: 0 | Refills: 0 | Status: DISCONTINUED | OUTPATIENT
Start: 2017-07-05 | End: 2017-07-05

## 2017-07-05 RX ORDER — AMIODARONE HYDROCHLORIDE 400 MG/1
1 TABLET ORAL
Qty: 30 | Refills: 0 | OUTPATIENT
Start: 2017-07-05 | End: 2017-08-04

## 2017-07-05 RX ORDER — METOPROLOL TARTRATE 50 MG
1 TABLET ORAL
Qty: 60 | Refills: 0 | OUTPATIENT
Start: 2017-07-05 | End: 2017-08-04

## 2017-07-05 RX ORDER — AMIODARONE HYDROCHLORIDE 400 MG/1
200 TABLET ORAL DAILY
Qty: 0 | Refills: 0 | Status: DISCONTINUED | OUTPATIENT
Start: 2017-07-06 | End: 2017-07-05

## 2017-07-05 RX ORDER — ISOSORBIDE MONONITRATE 60 MG/1
1 TABLET, EXTENDED RELEASE ORAL
Qty: 30 | Refills: 0 | OUTPATIENT
Start: 2017-07-05 | End: 2017-08-04

## 2017-07-05 RX ORDER — MAGNESIUM OXIDE 400 MG ORAL TABLET 241.3 MG
1 TABLET ORAL
Qty: 90 | Refills: 0 | OUTPATIENT
Start: 2017-07-05 | End: 2017-08-04

## 2017-07-05 RX ORDER — LOSARTAN POTASSIUM 100 MG/1
0.5 TABLET, FILM COATED ORAL
Qty: 15 | Refills: 0 | OUTPATIENT
Start: 2017-07-05 | End: 2017-08-04

## 2017-07-05 RX ORDER — ATORVASTATIN CALCIUM 80 MG/1
1 TABLET, FILM COATED ORAL
Qty: 30 | Refills: 0 | OUTPATIENT
Start: 2017-07-05 | End: 2017-08-04

## 2017-07-05 RX ADMIN — FAMOTIDINE 20 MILLIGRAM(S): 10 INJECTION INTRAVENOUS at 12:40

## 2017-07-05 RX ADMIN — MAGNESIUM OXIDE 400 MG ORAL TABLET 400 MILLIGRAM(S): 241.3 TABLET ORAL at 17:11

## 2017-07-05 RX ADMIN — AMIODARONE HYDROCHLORIDE 400 MILLIGRAM(S): 400 TABLET ORAL at 17:11

## 2017-07-05 RX ADMIN — Medication 81 MILLIGRAM(S): at 12:40

## 2017-07-05 RX ADMIN — MAGNESIUM OXIDE 400 MG ORAL TABLET 400 MILLIGRAM(S): 241.3 TABLET ORAL at 12:40

## 2017-07-05 RX ADMIN — Medication 50 MILLIGRAM(S): at 12:40

## 2017-07-05 RX ADMIN — MAGNESIUM OXIDE 400 MG ORAL TABLET 400 MILLIGRAM(S): 241.3 TABLET ORAL at 07:56

## 2017-07-05 RX ADMIN — LOSARTAN POTASSIUM 12.5 MILLIGRAM(S): 100 TABLET, FILM COATED ORAL at 17:11

## 2017-07-05 RX ADMIN — Medication 50 MILLIGRAM(S): at 00:53

## 2017-07-05 RX ADMIN — Medication 100 MILLIGRAM(S): at 17:11

## 2017-07-05 RX ADMIN — Medication 1 TABLET(S): at 12:40

## 2017-07-05 RX ADMIN — AMIODARONE HYDROCHLORIDE 400 MILLIGRAM(S): 400 TABLET ORAL at 06:16

## 2017-07-05 RX ADMIN — ISOSORBIDE MONONITRATE 30 MILLIGRAM(S): 60 TABLET, EXTENDED RELEASE ORAL at 12:40

## 2017-07-05 RX ADMIN — WARFARIN SODIUM 5 MILLIGRAM(S): 2.5 TABLET ORAL at 12:58

## 2017-07-05 NOTE — DISCHARGE NOTE ADULT - HOSPITAL COURSE
70 yo M with PMH of CAD s/p CABG, s/p AICD placement in 2015, AFIB on Coumadin, HTN, HLD, very poor historian, GAVIOTAEMS to the ED with complaints of chest pain that began after his AICD fired 3 times. Stated that this has happened previously, most recently last month. He had his AICD interrogated at the time and was unremarkable. Patient stated that he also felt numbness and pain in his Left chest with radiation to his Left arm. Denied fever, chills, palpitations, SOB, cough, abdominal pain, nausea, vomiting, diarrhea, constipation, urinary frequency, urgency, or dysuria, headaches, changes in vision, dizziness, numbness, tingling, recent travel, recent antibiotic use, or sick contacts.    Patient is a very poor historian, as he is unable to provide a medication list, is not completely sure who his PMD or Cardiologist are, and is not able to provide more specifics for his PMH.    In the ED, patient was tachycardic (HR max of 130), hypertensive (/116). EKG showed AFIB with RVR @117bpm. CXR showed. Labs significant for Cardiac enzymes positive x1 (Trop .705, CKMB 4.2); no leukocytosis (WBC 6.8), therapeutic INR (2.41). Pacemaker interrogated and showed rapid afib, but a properly functioning device. Received Amiodarone 150mg IV x1, Morphine 4mg IV x1. Evaluated by Dr. Lux in the ED.     Admitted to telemetry, had rapid afib, treated with lopressor and cardizem. Patient was also loaded with Amiodarone. Rate improved. Patient's chest pain resolved and he was cleared by cardiology for discharge. As patient is unsure of who his primary care physician or cardiologist are, he will follow up with Dr. Lux in 1 week's time. 72 yo M with PMH of CAD s/p CABG, s/p AICD placement in 2015, AFIB on Coumadin, HTN, HLD, very poor historian, GAVIOTAEMS to the ED with complaints of chest pain that began after his AICD fired 3 times. Stated that this has happened previously, most recently last month. He had his AICD interrogated at the time and was unremarkable. Patient stated that he also felt numbness and pain in his Left chest with radiation to his Left arm. Denied fever, chills, palpitations, SOB, cough, abdominal pain, nausea, vomiting, diarrhea, constipation, urinary frequency, urgency, or dysuria, headaches, changes in vision, dizziness, numbness, tingling, recent travel, recent antibiotic use, or sick contacts.    Patient is a very poor historian, as he is unable to provide a medication list, is not completely sure who his PMD or Cardiologist are, and is not able to provide more specifics for his PMH.    In the ED, patient was tachycardic (HR max of 130), hypertensive (/116). EKG showed AFIB with RVR @117bpm. CXR showed. Labs significant for Cardiac enzymes positive x1 (Trop .705, CKMB 4.2); no leukocytosis (WBC 6.8), therapeutic INR (2.41). Pacemaker interrogated and showed rapid afib, but a properly functioning device. Received Amiodarone 150mg IV x1, Morphine 4mg IV x1. Evaluated by Dr. Lux in the ED.     Admitted to telemetry, had rapid afib, treated with lopressor and cardizem. Patient was also loaded with Amiodarone. Rate improved. Patient's chest pain resolved and he was cleared by cardiology for discharge. As patient is unsure of who his primary care physician or cardiologist are, he will follow up with Dr. Lux in 1 week's time. He has an appointment scheduled on 7/12 at 2PM. 72 yo M with PMH of CAD s/p CABG, s/p AICD placement in 2015, AFIB on Coumadin, HTN, HLD, very poor historian, GAVIOTAEMS to the ED with complaints of chest pain that began after his AICD fired 3 times. Stated that this has happened previously, most recently last month. He had his AICD interrogated at the time and was unremarkable. Patient stated that he also felt numbness and pain in his Left chest with radiation to his Left arm. Denied fever, chills, palpitations, SOB, cough, abdominal pain, nausea, vomiting, diarrhea, constipation, urinary frequency, urgency, or dysuria, headaches, changes in vision, dizziness, numbness, tingling, recent travel, recent antibiotic use, or sick contacts.    Patient is a very poor historian, as he is unable to provide a medication list, is not completely sure who his PMD or Cardiologist are, and is not able to provide more specifics for his PMH.    In the ED, patient was tachycardic (HR max of 130), hypertensive (/116). EKG showed AFIB with RVR @117bpm. CXR showed. Labs significant for Cardiac enzymes positive x1 (Trop .705, CKMB 4.2); no leukocytosis (WBC 6.8), therapeutic INR (2.41). Pacemaker interrogated and showed rapid afib, but a properly functioning device. Received Amiodarone 150mg IV x1, Morphine 4mg IV x1. Evaluated by Dr. Lux in the ED.     Admitted to telemetry, had rapid afib, treated with lopressor and cardizem. Patient was also loaded with Amiodarone. Rate improved. Patient's chest pain resolved and he was cleared by cardiology for discharge. As patient is unsure of who his primary care physician or cardiologist are, he will follow up with Dr. Lux in 1 week's time. He has an appointment scheduled on 7/12 at 2PM.    Patient was seen and examined on day of discharge.  Vital Signs Last 24 Hrs  T(C): 36.7 (05 Jul 2017 15:48), Max: 36.7 (04 Jul 2017 20:05)  T(F): 98.1 (05 Jul 2017 15:48), Max: 98.1 (04 Jul 2017 20:05)  HR: 85 (05 Jul 2017 15:48) (55 - 85)  BP: 125/62 (05 Jul 2017 15:48) (100/63 - 134/76)  BP(mean): --  RR: 18 (05 Jul 2017 15:48) (16 - 18)  SpO2: 100% (05 Jul 2017 15:48) (95% - 100%)    Gen: AAOx3, NAD  HEENT: NC/AT, EOMI, neck supple, MMM  Resp: LCTAB/L, no R/R/W  Card: +s1s2, no murmur  Abd: soft, NT, ND, +BS  Ext: no C/C/E  Neuro: non-focal  Skin: no rash, lesions, warm and dry      Time spent: 45 mins

## 2017-07-05 NOTE — DISCHARGE NOTE ADULT - CARE PROVIDER_API CALL
Blade Lux), Cardiology Cardiology  [address] [place] [address]  Phone: [phone]  Fax: [phone]

## 2017-07-05 NOTE — DISCHARGE NOTE ADULT - ADDITIONAL INSTRUCTIONS
Si tienes dolor de pecho, dificultades con respiracion, o palpitaciones, pase por la jacqueline del emergencia.

## 2017-07-05 NOTE — DISCHARGE NOTE ADULT - CARE PLAN
Principal Discharge DX:	Defibrillator discharge  Goal:	prevention of further episodes  Instructions for follow-up, activity and diet:	aidee los medicamentos lorena son prescritos.   hacer halina ange con Dr. Lux en halina semana.  Secondary Diagnosis:	Coronary artery disease  Instructions for follow-up, activity and diet:	aidee los medicamentos lorena son prescritos.   hacer halina ange con Dr. Lux en halina semana.  Secondary Diagnosis:	HLD (hyperlipidemia)  Instructions for follow-up, activity and diet:	aidee los medicamentos lorena son prescritos.   hacer halina ange con Dr. Lux en halina semana.  Secondary Diagnosis:	HTN (hypertension)  Instructions for follow-up, activity and diet:	aidee los medicamentos lorena son prescritos.   hacer halina ange con Dr. Lux en halina semana.  Secondary Diagnosis:	Rapid atrial fibrillation  Instructions for follow-up, activity and diet:	aidee los medicamentos lorena son prescritos.   hacer halina ange con Dr. Lux en halina semana. Principal Discharge DX:	Defibrillator discharge  Goal:	prevention of further episodes  Instructions for follow-up, activity and diet:	aidee los medicamentos lorena son prescritos.   hacer halina ange con Dr. Lux en halina semana.  Secondary Diagnosis:	Coronary artery disease  Instructions for follow-up, activity and diet:	aidee los medicamentos lorena son prescritos.   hacer halina ange con Dr. Lux en halina semana.  Secondary Diagnosis:	HLD (hyperlipidemia)  Instructions for follow-up, activity and diet:	aidee los medicamentos lorena son prescritos.   hacer halina ange con Dr. Lux en halina semana.  Secondary Diagnosis:	HTN (hypertension)  Instructions for follow-up, activity and diet:	aidee los medicamentos lorena son prescritos.   hacer halina ange con Dr. Lux en halina semana.  Secondary Diagnosis:	Rapid atrial fibrillation  Instructions for follow-up, activity and diet:	aidee los medicamentos lorena son prescritos.   hacer halina ange con Dr. Lux en halnia semana.

## 2017-07-05 NOTE — DISCHARGE NOTE ADULT - PLAN OF CARE
prevention of further episodes aidee los medicamentos lorena son prescritos.   hacer halina ange con Dr. Lux en halina semana.

## 2017-07-05 NOTE — DISCHARGE NOTE ADULT - MEDICATION SUMMARY - MEDICATIONS TO TAKE
I will START or STAY ON the medications listed below when I get home from the hospital:    aspirin 81 mg oral delayed release tablet  -- 1 tab(s) by mouth once a day  -- Indication: For prevention    losartan 25 mg oral tablet  -- 0.5 tab(s) by mouth once a day  -- Do not take this drug if you are pregnant.  It is very important that you take or use this exactly as directed.  Do not skip doses or discontinue unless directed by your doctor.  Some non-prescription drugs may aggravate your condition.  Read all labels carefully.  If a warning appears, check with your doctor before taking.    -- Indication: For Hypertension    isosorbide mononitrate 30 mg oral tablet, extended release  -- 1 tab(s) by mouth once a day  -- Indication: For Hypertension    amiodarone 200 mg oral tablet  -- 1 tab(s) by mouth once a day  -- Indication: For Afib    Coumadin 4 mg oral tablet  -- 1 tab(s) by mouth once a day  -- Do not take this drug if you are pregnant.  It is very important that you take or use this exactly as directed.  Do not skip doses or discontinue unless directed by your doctor.  Obtain medical advice before taking any non-prescription drugs as some may affect the action of this medication.    -- Indication: For Afib    atorvastatin 20 mg oral tablet  -- 1 tab(s) by mouth once a day (at bedtime)  -- Indication: For HLD (hyperlipidemia)    metoprolol tartrate 100 mg oral tablet  -- 1 tab(s) by mouth 2 times a day  -- Indication: For Afib    magnesium oxide 400 mg (241.3 mg elemental magnesium) oral tablet  -- 1 tab(s) by mouth 3 times a day (with meals)  -- Indication: For Supplement

## 2017-07-05 NOTE — DISCHARGE NOTE ADULT - PATIENT PORTAL LINK FT
“You can access the [facility] Patient Portal, offered by [facility], by registering with the following website: [email or web address]”

## 2017-07-05 NOTE — PROGRESS NOTE ADULT - SUBJECTIVE AND OBJECTIVE BOX
[facility]       HPI:  70 yo M with PMH of CAD s/p CABG, s/p AICD placement in 2015, AFIB on ?Coumadin, HTN, HLD, very poor historian, JEFRY to the ED with complaints of chest pain that began after his AICD fired 3 times earlier tonight. Stated that this has happened previously, most recently last month. He had his AICD interrogated at the time and was unremarkable. Patient stated that he also felt numbness and pain in his Left chest with radiation to his Left arm. Denied fever, chills, palpitations, SOB, cough, abdominal pain, nausea, vomiting, diarrhea, constipation, urinary frequency, urgency, or dysuria, headaches, changes in vision, dizziness, numbness, tingling, recent travel, recent antibiotic use, or sick contacts.    Patient is a very poor historian, as he is unable to provide a medication list, is not completely sure who his PMD or Cardiologist are, and is not able to provide more specifics for his PMH. Patient stated that [facility] to [facility] (and was adamant that it was not a [facility]).    In the ED, patient was tachycardic (HR max of 130), hypertensive (/116). EKG showed AFIB with RVR @117bpm. CXR showed. Labs significant for Cardiac enzymes positive x1 (Trop .705, CKMB 4.2); no leukocytosis (WBC 6.8), therapeutic INR (2.41). Received Amiodarone 150mg IV x1, Morphine 4mg IV x1. Evaluated by Dr. Lux in the ED. (03 Jul 2017 02:06)        SUBJECTIVE: patient lying comfortable .      ALLERGIES:  Allergies    No Known Allergies    Intolerances          MEDICATIONS  (STANDING):  famotidine    Tablet 20 milliGRAM(s) Oral daily  multivitamin 1 Tablet(s) Oral daily  isosorbide   mononitrate ER Tablet (IMDUR) 30 milliGRAM(s) Oral daily  aspirin enteric coated 81 milliGRAM(s) Oral daily  magnesium oxide 400 milliGRAM(s) Oral three times a day with meals  atorvastatin 20 milliGRAM(s) Oral at bedtime  amiodarone    Tablet 400 milliGRAM(s) Oral two times a day  warfarin 5 milliGRAM(s) Oral once  losartan 12.5 milliGRAM(s) Oral daily  metoprolol 100 milliGRAM(s) Oral two times a day    MEDICATIONS  (PRN):  diltiazem Injectable 10 milliGRAM(s) IV Push every 2 hours PRN for heart ernie >120      REVIEW OF SYSTEMS:  CONSTITUTIONAL: No fever,  RESPIRATORY: No cough, wheezing, shortness of breath  CARDIOVASCULAR: No chest pain, dyspnea, palpitations, dizziness, syncope, paroxysmal nocturnal dyspnea, orthopnea, or arm or leg swelling  GASTROINTESTINAL: No abdominal  or epigastric pain, nausea, vomiting,  diarrhea  NEUROLOGICAL: No headaches,  loss of strength, numbness, or tremors    Vital Signs Last 24 Hrs  T(C): 36.4 (05 Jul 2017 11:08), Max: 36.7 (04 Jul 2017 20:05)  T(F): 97.5 (05 Jul 2017 11:08), Max: 98.1 (04 Jul 2017 20:05)  HR: 73 (05 Jul 2017 11:08) (55 - 73)  BP: 121/72 (05 Jul 2017 11:08) (99/62 - 134/76)  BP(mean): --  RR: 16 (05 Jul 2017 11:08) (16 - 18)  SpO2: 99% (05 Jul 2017 11:08) (95% - 99%)    PHYSICAL EXAM:  HEAD:  Atraumatic, Normocephalic  NECK: Supple and normal thyroid.  No JVD or carotid bruit.   HEART: S1, S2 regular , 1/6 soft ejection systolic murmur in mitral area , no thrill and no gallops .  PULMONARY: Bilateral vesicular breathing , few scattered ronchi and few scattered rales are present .  ABDOMEN: Soft nontender and positive bowl sounds   EXTREMITIES:  No clubbing, cyanosis, or pedal  edema  NEUROLOGICAL: AAOX3 , no focal deficit .    LABS:                        14.2   4.8   )-----------( 136      ( 05 Jul 2017 06:41 )             42.0     07-05    142  |  106  |  20  ----------------------------<  84  3.8   |  29  |  0.72    Ca    8.2<L>      05 Jul 2017 06:42  Phos  2.7     07-04  Mg     2.0     07-04      CARDIAC MARKERS ( 03 Jul 2017 13:34 )  .208 ng/mL / x     / 306 U/L / x     / 3.4 ng/mL      PT/INR - ( 05 Jul 2017 06:42 )   PT: 18.3 sec;   INR: 1.66 ratio                   Assessment/Plan  Patient has :  1) S/P AICD shock for rapid atrial fibrillation episodes   2) Mild CHF and stable .  3) AICD and functioning well .  Plan : 1) Decrease amiodarone 200 mg once a day from am . 2) decrease Losartan 12.5 mg po once a day and change metoprolol 100 mg po twice a day .3) continue coumadin as out patient 4 mg po once a day . 4) cardiology F/U with me 1-2 weeks [id number]  Will continue to follow during hospital course and give further recommendations as needed . Thanks for your referral . if any questions please contact me at office [phone] [phone])

## 2017-07-07 DIAGNOSIS — I50.9 HEART FAILURE, UNSPECIFIED: ICD-10-CM

## 2017-07-07 DIAGNOSIS — T82.897A OTHER SPECIFIED COMPLICATION OF CARDIAC PROSTHETIC DEVICES, IMPLANTS AND GRAFTS, INITIAL ENCOUNTER: ICD-10-CM

## 2017-07-07 DIAGNOSIS — I25.10 ATHEROSCLEROTIC HEART DISEASE OF NATIVE CORONARY ARTERY WITHOUT ANGINA PECTORIS: ICD-10-CM

## 2017-07-07 DIAGNOSIS — Z45.02 ENCOUNTER FOR ADJUSTMENT AND MANAGEMENT OF AUTOMATIC IMPLANTABLE CARDIAC DEFIBRILLATOR: ICD-10-CM

## 2017-07-07 DIAGNOSIS — I48.91 UNSPECIFIED ATRIAL FIBRILLATION: ICD-10-CM

## 2017-07-10 RX ORDER — CARVEDILOL PHOSPHATE 80 MG/1
1 CAPSULE, EXTENDED RELEASE ORAL
Qty: 0 | Refills: 0 | COMMUNITY

## 2017-07-12 ENCOUNTER — EMERGENCY (EMERGENCY)
Facility: HOSPITAL | Age: 72
LOS: 1 days | Discharge: HOME | End: 2017-07-12
Attending: EMERGENCY MEDICINE | Admitting: EMERGENCY MEDICINE
Payer: MEDICARE

## 2017-07-12 VITALS
TEMPERATURE: 98 F | SYSTOLIC BLOOD PRESSURE: 115 MMHG | DIASTOLIC BLOOD PRESSURE: 71 MMHG | HEART RATE: 61 BPM | OXYGEN SATURATION: 98 % | RESPIRATION RATE: 17 BRPM

## 2017-07-12 VITALS
RESPIRATION RATE: 16 BRPM | HEIGHT: 63 IN | OXYGEN SATURATION: 97 % | TEMPERATURE: 99 F | HEART RATE: 68 BPM | WEIGHT: 171.96 LBS | SYSTOLIC BLOOD PRESSURE: 134 MMHG | DIASTOLIC BLOOD PRESSURE: 78 MMHG

## 2017-07-12 DIAGNOSIS — Z95.810 PRESENCE OF AUTOMATIC (IMPLANTABLE) CARDIAC DEFIBRILLATOR: ICD-10-CM

## 2017-07-12 DIAGNOSIS — Z79.82 LONG TERM (CURRENT) USE OF ASPIRIN: ICD-10-CM

## 2017-07-12 DIAGNOSIS — E78.5 HYPERLIPIDEMIA, UNSPECIFIED: ICD-10-CM

## 2017-07-12 DIAGNOSIS — I48.91 UNSPECIFIED ATRIAL FIBRILLATION: ICD-10-CM

## 2017-07-12 DIAGNOSIS — I25.10 ATHEROSCLEROTIC HEART DISEASE OF NATIVE CORONARY ARTERY WITHOUT ANGINA PECTORIS: ICD-10-CM

## 2017-07-12 DIAGNOSIS — Z85.46 PERSONAL HISTORY OF MALIGNANT NEOPLASM OF PROSTATE: ICD-10-CM

## 2017-07-12 DIAGNOSIS — R06.09 OTHER FORMS OF DYSPNEA: ICD-10-CM

## 2017-07-12 DIAGNOSIS — Z95.1 PRESENCE OF AORTOCORONARY BYPASS GRAFT: ICD-10-CM

## 2017-07-12 DIAGNOSIS — Z79.01 LONG TERM (CURRENT) USE OF ANTICOAGULANTS: ICD-10-CM

## 2017-07-12 DIAGNOSIS — I10 ESSENTIAL (PRIMARY) HYPERTENSION: ICD-10-CM

## 2017-07-12 DIAGNOSIS — Z95.1 PRESENCE OF AORTOCORONARY BYPASS GRAFT: Chronic | ICD-10-CM

## 2017-07-12 DIAGNOSIS — Z95.810 PRESENCE OF AUTOMATIC (IMPLANTABLE) CARDIAC DEFIBRILLATOR: Chronic | ICD-10-CM

## 2017-07-12 DIAGNOSIS — N40.0 BENIGN PROSTATIC HYPERPLASIA WITHOUT LOWER URINARY TRACT SYMPTOMS: ICD-10-CM

## 2017-07-12 LAB
ANION GAP SERPL CALC-SCNC: 6 MMOL/L — SIGNIFICANT CHANGE UP (ref 5–17)
BUN SERPL-MCNC: 18 MG/DL — SIGNIFICANT CHANGE UP (ref 7–23)
CALCIUM SERPL-MCNC: 8.5 MG/DL — SIGNIFICANT CHANGE UP (ref 8.5–10.1)
CHLORIDE SERPL-SCNC: 107 MMOL/L — SIGNIFICANT CHANGE UP (ref 96–108)
CO2 SERPL-SCNC: 25 MMOL/L — SIGNIFICANT CHANGE UP (ref 22–31)
CREAT SERPL-MCNC: 0.88 MG/DL — SIGNIFICANT CHANGE UP (ref 0.5–1.3)
GLUCOSE SERPL-MCNC: 94 MG/DL — SIGNIFICANT CHANGE UP (ref 70–99)
HCT VFR BLD CALC: 44 % — SIGNIFICANT CHANGE UP (ref 39–50)
HGB BLD-MCNC: 14.9 G/DL — SIGNIFICANT CHANGE UP (ref 13–17)
MCHC RBC-ENTMCNC: 29.3 PG — SIGNIFICANT CHANGE UP (ref 27–34)
MCHC RBC-ENTMCNC: 33.8 GM/DL — SIGNIFICANT CHANGE UP (ref 32–36)
MCV RBC AUTO: 86.7 FL — SIGNIFICANT CHANGE UP (ref 80–100)
PLATELET # BLD AUTO: 148 K/UL — LOW (ref 150–400)
POTASSIUM SERPL-MCNC: 4.6 MMOL/L — SIGNIFICANT CHANGE UP (ref 3.5–5.3)
POTASSIUM SERPL-SCNC: 4.6 MMOL/L — SIGNIFICANT CHANGE UP (ref 3.5–5.3)
RBC # BLD: 5.07 M/UL — SIGNIFICANT CHANGE UP (ref 4.2–5.8)
RBC # FLD: 12.8 % — SIGNIFICANT CHANGE UP (ref 10.3–14.5)
SODIUM SERPL-SCNC: 138 MMOL/L — SIGNIFICANT CHANGE UP (ref 135–145)
WBC # BLD: 5.6 K/UL — SIGNIFICANT CHANGE UP (ref 3.8–10.5)
WBC # FLD AUTO: 5.6 K/UL — SIGNIFICANT CHANGE UP (ref 3.8–10.5)

## 2017-07-12 PROCEDURE — 99284 EMERGENCY DEPT VISIT MOD MDM: CPT

## 2017-07-12 PROCEDURE — 80048 BASIC METABOLIC PNL TOTAL CA: CPT

## 2017-07-12 PROCEDURE — 93005 ELECTROCARDIOGRAM TRACING: CPT

## 2017-07-12 PROCEDURE — 71045 X-RAY EXAM CHEST 1 VIEW: CPT

## 2017-07-12 PROCEDURE — 99284 EMERGENCY DEPT VISIT MOD MDM: CPT | Mod: 25

## 2017-07-12 PROCEDURE — 71010: CPT | Mod: 26

## 2017-07-12 PROCEDURE — 85027 COMPLETE CBC AUTOMATED: CPT

## 2017-07-12 NOTE — ED ADULT NURSE NOTE - PMH
Atrial Fibrillation    Atrial fibrillation, unspecified type    BPH (Benign Prostatic Hypertrophy)    CA - Cancer of Prostate    CAD (Coronary Atherosclerotic Disease)    Cholesterol Serum Elevated    Coronary artery disease    HLD (hyperlipidemia)    HTN (hypertension)    Hypertension    Personal History of Smoking    Radiation Therapy  prostate RT 21 sessions

## 2017-07-12 NOTE — ED PROVIDER NOTE - PSH
AICD (Automatic Cardioverter/Defibrillator) Present  medtronic  implant date 11/22/ 2010 serial # [id number]  MODEL # [id number]  card copy on chart  AICD (automatic cardioverter/defibrillator) present    Cataract    S/P CABG (coronary artery bypass graft)

## 2017-07-12 NOTE — ED ADULT NURSE NOTE - PSH
AICD (Automatic Cardioverter/Defibrillator) Present  medtronic  implant date 11/22/ 2010 serial # [id number]  MODEL # [id number]  card copy on chart  AICD (automatic cardioverter/defibrillator) present    Cataract    S/P CABG (coronary artery bypass graft)

## 2017-07-12 NOTE — ED ADULT NURSE NOTE - OBJECTIVE STATEMENT
Pt. received alert and oriented x4 with chief complaint of "a little chest pain and tired for past few days." Pt. placed on continuous cardiac monitor with continuous pulse ox. Pt. denies SOB.

## 2017-07-12 NOTE — ED PROVIDER NOTE - CONSTITUTIONAL, MLM
normal... Well appearing  male, well nourished, awake, alert, oriented to person, place, time/situation and in no apparent distress.

## 2017-07-12 NOTE — ED PROVIDER NOTE - OBJECTIVE STATEMENT
70 yo  male, scheduled for elective appointment with Dr. Lux today, sent here by staff as a result of Dr. Lux not being in today. Patients only complaints is mild GAINES which is not new. No chest pain and no fever, chills, cough or edema.

## 2018-02-04 NOTE — ED ADULT TRIAGE NOTE - MODE OF ARRIVAL
Private Vehicle decreased ability to use arms for pushing/pulling/decreased ability to use legs for bridging/pushing/impaired ability to control trunk for mobility

## 2018-05-11 ENCOUNTER — APPOINTMENT (OUTPATIENT)
Dept: PULMONOLOGY | Facility: CLINIC | Age: 73
End: 2018-05-11
Payer: MEDICAID

## 2018-05-11 VITALS
BODY MASS INDEX: 36.68 KG/M2 | TEMPERATURE: 99.1 F | HEART RATE: 75 BPM | RESPIRATION RATE: 18 BRPM | OXYGEN SATURATION: 97 % | WEIGHT: 207 LBS | HEIGHT: 63 IN | SYSTOLIC BLOOD PRESSURE: 120 MMHG | DIASTOLIC BLOOD PRESSURE: 70 MMHG

## 2018-05-11 DIAGNOSIS — G47.33 OBSTRUCTIVE SLEEP APNEA (ADULT) (PEDIATRIC): ICD-10-CM

## 2018-05-11 DIAGNOSIS — C61 MALIGNANT NEOPLASM OF PROSTATE: ICD-10-CM

## 2018-05-11 DIAGNOSIS — I10 ESSENTIAL (PRIMARY) HYPERTENSION: ICD-10-CM

## 2018-05-11 DIAGNOSIS — Z87.898 PERSONAL HISTORY OF OTHER SPECIFIED CONDITIONS: ICD-10-CM

## 2018-05-11 DIAGNOSIS — Z87.891 PERSONAL HISTORY OF NICOTINE DEPENDENCE: ICD-10-CM

## 2018-05-11 DIAGNOSIS — I25.10 ATHEROSCLEROTIC HEART DISEASE OF NATIVE CORONARY ARTERY W/OUT ANGINA PECTORIS: ICD-10-CM

## 2018-05-11 DIAGNOSIS — E78.5 HYPERLIPIDEMIA, UNSPECIFIED: ICD-10-CM

## 2018-05-11 DIAGNOSIS — I21.9 ACUTE MYOCARDIAL INFARCTION, UNSPECIFIED: ICD-10-CM

## 2018-05-11 PROCEDURE — 99205 OFFICE O/P NEW HI 60 MIN: CPT

## 2018-06-10 ENCOUNTER — FORM ENCOUNTER (OUTPATIENT)
Age: 73
End: 2018-06-10

## 2018-06-22 ENCOUNTER — RESULT REVIEW (OUTPATIENT)
Age: 73
End: 2018-06-22

## 2018-06-22 DIAGNOSIS — G47.31 PRIMARY CENTRAL SLEEP APNEA: ICD-10-CM

## 2018-06-22 DIAGNOSIS — R06.3 PERIODIC BREATHING: ICD-10-CM

## 2018-06-22 NOTE — ED PROVIDER NOTE - PSH
AICD (automatic cardioverter/defibrillator) present    S/P CABG (coronary artery bypass graft) (0) swallows foods and liquids w/o difficulty

## 2018-07-08 ENCOUNTER — FORM ENCOUNTER (OUTPATIENT)
Age: 73
End: 2018-07-08

## 2018-07-17 ENCOUNTER — RESULT REVIEW (OUTPATIENT)
Age: 73
End: 2018-07-17

## 2018-08-20 PROBLEM — E78.5 HYPERLIPIDEMIA, UNSPECIFIED: Chronic | Status: ACTIVE | Noted: 2017-07-03

## 2018-08-20 PROBLEM — I10 ESSENTIAL (PRIMARY) HYPERTENSION: Chronic | Status: ACTIVE | Noted: 2017-07-03

## 2018-09-26 ENCOUNTER — APPOINTMENT (OUTPATIENT)
Dept: PULMONOLOGY | Facility: CLINIC | Age: 73
End: 2018-09-26

## 2018-10-12 ENCOUNTER — APPOINTMENT (OUTPATIENT)
Dept: PULMONOLOGY | Facility: CLINIC | Age: 73
End: 2018-10-12

## 2018-10-26 ENCOUNTER — APPOINTMENT (OUTPATIENT)
Dept: PULMONOLOGY | Facility: CLINIC | Age: 73
End: 2018-10-26

## 2019-04-06 NOTE — DISCHARGE NOTE ADULT - NS AS DC AMI YN
Subjective:  Khadar Dent is a 61 y.o. male who presents for       Patient Active Problem List   Diagnosis   • Annual physical exam   • General medical examination   • Encounter for screening for diabetes mellitus   • Encounter for screening for cardiovascular disorders   • Encounter for screening for endocrine disorder   • Chronic obstructive pulmonary disease (CMS/HCC)   • Tobacco abuse counseling   • Tobacco user   • Dyspnea   • Back pain   • Elevated blood pressure reading   • Alcohol abuse counseling and surveillance   • Alcohol abuse   • Tachycardia   • Chest pain           Current Outpatient Medications:   •  albuterol (ACCUNEB) 1.25 MG/3ML nebulizer solution, Take 3 mL by nebulization Every 6 (Six) Hours As Needed for Wheezing., Disp: 1 vial, Rfl: 3  •  albuterol sulfate  (90 Base) MCG/ACT inhaler, Inhale 2 puffs Every 6 (Six) Hours As Needed for Wheezing., Disp: 1 inhaler, Rfl: 3  •  lisinopril-hydrochlorothiazide (PRINZIDE,ZESTORETIC) 20-12.5 MG per tablet, Take 1 tablet by mouth Daily., Disp: 30 tablet, Rfl: 3  •  nicotine (NICODERM CQ) 21 MG/24HR patch, Place 1 patch on the skin as directed by provider Daily., Disp: 30 patch, Rfl: 3     Pt is 60 yo male with history of COPD, chronic back pain, tobacco use who is here for recheck. He was recently admitted to [facility]. Do not have discharge summary currently here. He states he was at [facility] and treated for pneumonai for 2 days. He continues to have shortness of breath and continues to smoke 1 ppd .  He missed his appt with Cardiologsit this past Friday.  He conitnues to drink a 6 pack of beer.        Chest Pain    The current episode started more than 1 year ago. The onset quality is gradual. The problem occurs intermittently. The problem has been waxing and waning. The pain is present in the substernal region and lateral region. The pain is at a severity of 5/10. The pain is moderate. The quality of the pain is described as numbness, pressure and  tightness. Associated symptoms include back pain, dizziness, exertional chest pressure, headaches, numbness, palpitations, shortness of breath and weakness. Pertinent negatives include no abdominal pain, claudication, cough, diaphoresis, fever, hemoptysis, irregular heartbeat, leg pain, lower extremity edema, malaise/fatigue, nausea, near-syncope, orthopnea, PND, sputum production, syncope or vomiting. The pain is aggravated by breathing, coughing, deep breathing, movement, walking and exertion. The treatment provided no relief. Risk factors include alcohol intake, being elderly, lack of exercise, smoking/tobacco exposure, sedentary lifestyle and stress.   His past medical history is significant for hypertension.   Pertinent negatives for past medical history include no aneurysm, no anxiety/panic attacks, no aortic aneurysm, no aortic dissection, no arrhythmia, no bicuspid aortic valve, no CAD, no cancer, no congenital heart disease, no connective tissue disease, no COPD, no CHF, no diabetes, no DVT, no hyperhomocysteinemia, no hyperlipidemia, no Kawasaki disease, no Marfan's syndrome, no MI, no mitral valve prolapse, no pacemaker, no PE, no PVD, no recent injury, no rheumatic fever, no seizures, no sickle cell disease, no sleep apnea, no spontaneous pneumothorax, no stimulant use, no strokes, no thyroid problem, no TIA, Shin syndrome and no valve disorder.   Hypertension   This is a chronic problem. The current episode started more than 1 year ago. The problem has been waxing and waning since onset. The problem is uncontrolled. Associated symptoms include chest pain, headaches, neck pain, palpitations and shortness of breath. Pertinent negatives include no anxiety, malaise/fatigue, orthopnea, peripheral edema, PND or sweats. Risk factors for coronary artery disease include male gender, sedentary lifestyle, smoking/tobacco exposure and dyslipidemia. Past treatments include ACE inhibitors and diuretics. Current  antihypertension treatment includes ACE inhibitors and diuretics. The current treatment provides no improvement. There are no compliance problems.  There is no history of angina, kidney disease, CAD/MI, CVA, heart failure, left ventricular hypertrophy, PVD or retinopathy. There is no history of chronic renal disease, coarctation of the aorta, hyperaldosteronism, hypercortisolism, hyperparathyroidism, a hypertension causing med, pheochromocytoma, renovascular disease, sleep apnea or a thyroid problem.   Back Pain   This is a chronic problem. The current episode started more than 1 year ago. The problem occurs constantly. The problem is unchanged. The pain is present in the lumbar spine and sacro-iliac. The quality of the pain is described as aching. Stiffness is present all day. Associated symptoms include chest pain, headaches, numbness and weakness. Pertinent negatives include no abdominal pain, bladder incontinence, bowel incontinence, dysuria, fever, leg pain, paresis, paresthesias, pelvic pain, perianal numbness, tingling or weight loss. The treatment provided no relief.   Alcohol Problem   This is a chronic problem. The current episode started more than 1 year ago. The problem occurs constantly. The problem has been unchanged. Associated symptoms include arthralgias, chest pain, fatigue, headaches, joint swelling, myalgias, neck pain, numbness and weakness. Pertinent negatives include no abdominal pain, anorexia, change in bowel habit, chills, congestion, coughing, diaphoresis, fever, nausea, rash, sore throat, swollen glands, urinary symptoms, vertigo, visual change or vomiting. Nothing aggravates the symptoms. He has tried nothing for the symptoms. The treatment provided no relief.    COPD   This is a new problem. The current episode started more than 1 year ago. The problem occurs constantly. Associated symptoms include arthralgias, chest pain, fatigue, headaches and weakness. Pertinent negatives include no  abdominal pain, anorexia, change in bowel habit, chills, congestion, coughing, diaphoresis, fever, joint swelling, myalgias, nausea, neck pain or numbness.     Review of Systems  Review of Systems   Constitutional: Positive for activity change and fatigue. Negative for appetite change, chills, diaphoresis, fever, malaise/fatigue and weight loss.   HENT: Negative for congestion, postnasal drip, rhinorrhea, sinus pressure, sinus pain, sneezing, sore throat, trouble swallowing and voice change.    Respiratory: Positive for chest tightness and shortness of breath. Negative for cough, hemoptysis, sputum production, choking, wheezing and stridor.    Cardiovascular: Positive for chest pain and palpitations. Negative for orthopnea, claudication, syncope, PND and near-syncope.   Gastrointestinal: Negative for abdominal pain, anorexia, bowel incontinence, change in bowel habit, diarrhea, nausea and vomiting.   Genitourinary: Negative for bladder incontinence, dysuria and pelvic pain.   Musculoskeletal: Positive for arthralgias, back pain, gait problem, joint swelling, myalgias, neck pain and neck stiffness.   Skin: Negative for rash.   Neurological: Positive for dizziness, weakness, numbness and headaches. Negative for vertigo, tingling, seizures and paresthesias.       Patient Active Problem List   Diagnosis   • Annual physical exam   • General medical examination   • Encounter for screening for diabetes mellitus   • Encounter for screening for cardiovascular disorders   • Encounter for screening for endocrine disorder   • Chronic obstructive pulmonary disease (CMS/HCC)   • Tobacco abuse counseling   • Tobacco user   • Dyspnea   • Back pain   • Elevated blood pressure reading   • Alcohol abuse counseling and surveillance   • Alcohol abuse   • Tachycardia   • Chest pain     No past surgical history on file.  Social History     Socioeconomic History   • Marital status:      Spouse name: Not on file   • Number of  "children: Not on file   • Years of education: Not on file   • Highest education level: Not on file   Tobacco Use   • Smoking status: Current Every Day Smoker   • Smokeless tobacco: Never Used   Substance and Sexual Activity   • Alcohol use: Yes     Family History   Problem Relation Age of Onset   • Heart disease Mother    • Stroke Mother    • Stroke Father    • Heart disease Father    • Heart disease Brother      No visits with results within 6 Month(s) from this visit.   Latest known visit with results is:   No results found for any previous visit.      No image results found.    @[facility]@    There is no immunization history on file for this patient.    The following portions of the patient's history were reviewed and updated as appropriate: allergies, current medications, past family history, past medical history, past social history, past surgical history and problem list.        Physical Exam  /70   Pulse 96   Temp 98 °F (36.7 °C)   Ht 165.1 cm (65\")   Wt 63.1 kg (139 lb 3.2 oz)   SpO2 93%   BMI 23.16 kg/m²     Physical Exam   Constitutional: He is oriented to person, place, and time. He appears well-developed and well-nourished.   HENT:   Head: Normocephalic and atraumatic.   Right Ear: External ear normal.   Eyes: Conjunctivae and EOM are normal. Pupils are equal, round, and reactive to light.   Neck: Normal range of motion. Neck supple.   Cardiovascular: Normal rate, regular rhythm and normal heart sounds.   No murmur heard.  Pulmonary/Chest: Effort normal.   Decreased breath sounds in all lung fields  Wheezing    Abdominal: Soft. Bowel sounds are normal. He exhibits no distension. There is no tenderness.   Musculoskeletal: Normal range of motion. He exhibits no edema or deformity.   Neurological: He is alert and oriented to person, place, and time. No cranial nerve deficit.   Skin: Skin is warm. No rash noted. He is not diaphoretic. No erythema. No pallor.   Psychiatric: He has a normal mood " and affect. His behavior is normal.   Nursing note and vitals reviewed.      Assessment/Plan   Problems Addressed this Visit        Cardiovascular and Mediastinum    Tachycardia       Respiratory    Chronic obstructive pulmonary disease (CMS/HCC)       Nervous and Auditory    Back pain    Chest pain       Other    Encounter for screening for diabetes mellitus    Encounter for screening for cardiovascular disorders    Encounter for screening for endocrine disorder    Tobacco abuse counseling - Primary    Tobacco user    Alcohol abuse counseling and surveillance    Alcohol abuse        -recommend labwork. He has yet to get labwork or chest x-ray  -will need records from [facility] regarding recent hospital admission  -need records from DR. Garcia previous PC  -advised pt will not fill out controlled medication such as Lortab until more information is provided. In meantime will get x-ray of lumbar spine  -hypertension - lisionpril-HCTZ   -dyspnea/COPD - will get chest x-ray . Will also refer to DR. Spain for Pulmonary function test. Gave prescription for nebulizer and solution today.    -tachycardia - EKG last visit showed NSR with septal infarct. Abnormal EKG - pt was reerred to Cardiology   -pt allergic to aspirin   -for chest pain abnormal EKG recommend pt go to ER or call 911 if symptom severe. EKG showed NSR with septal infarct. Recommend Cardiology referral. Dr. Malhotra consultation appreciated  -counseld pt to quit alcohol and tobacco cessation >5 minutes. Gave information to quit smoking. Gave prescription for nicotine patch   -alcohol abuse -gave information on alcohol abuse .counseled >5 minutes. Gave information on alcohol abuse.   -advised to go to ER of call 911 if symptoms worrisome or severe  - he states he has high blood pressure and it is elevated today. Will start on lisionpril-HCTZ 20-12.5 mg daily - recommend low sodium diet . Gave information provided   -advised pt to be safe and call with any  questions or concerns. All qusetion addressed today   -recheck in  2 weeks           This document has been electronically signed by Toby Harrell MD on April 6, 2019 12:45 PM                    This document has been electronically signed by Toby Harrell MD on April 6, 2019 12:40 PM      no

## 2022-04-05 NOTE — DISCHARGE NOTE ADULT - MEDICATION SUMMARY - MEDICATIONS TO CHANGE
I will SWITCH the dose or number of times a day I take the medications listed below when I get home from the hospital:  None Not Applicable

## 2023-04-26 NOTE — DISCHARGE NOTE ADULT - FUNCTIONAL SCREEN CURRENT LEVEL: BATHING, MLM
CLINICAL PHARMACY NOTE: MEDS TO BEDS    Total # of Prescriptions Filled: 1   The following medications were delivered to the patient:  PERCOCET 5/325 MG     Additional Documentation: (0) independent

## 2025-03-19 NOTE — PROGRESS NOTE ADULT - PROBLEM SELECTOR PLAN 6
Peripheral Block    Patient location during procedure: holding area  Reason for block: post-op pain management  Start time: 3/19/2025 8:06 AM  End time: 3/19/2025 8:08 AM  Staffing  Performed: anesthesiologist   Anesthesiologist: Katie Reyes MD  Performed by: Katie Reyes MD  Authorized by: Katie Reyes MD    Preanesthetic Checklist  Completed: patient identified, IV checked, site marked, risks and benefits discussed, surgical/procedural consents, equipment checked, pre-op evaluation, timeout performed, anesthesia consent given, oxygen available, monitors applied/VS acknowledged, fire risk safety assessment completed and verbalized and blood product R/B/A discussed and consented  Peripheral Block   Patient position: supine  Prep: ChloraPrep  Provider prep: mask and sterile gloves  Patient monitoring: cardiac monitor, continuous pulse ox and frequent blood pressure checks  Block type: Femoral  Adductor canal  Laterality: right  Injection technique: single-shot  Guidance: ultrasound guided  Local infiltration: lidocaine  Infiltration strength: 1 %  Local infiltration: lidocaine  Dose: 0.5 mL    Needle   Needle type: insulated echogenic nerve stimulator needle   Needle gauge: 20 G  Needle localization: ultrasound guidance  Needle length: 10 cm  Assessment   Injection assessment: negative aspiration for heme, no paresthesia on injection, local visualized surrounding nerve on ultrasound and no intravascular symptoms  Paresthesia pain: none  Slow fractionated injection: yes  Hemodynamics: stable  Outcomes: uncomplicated and patient tolerated procedure well    Medications Administered  ropivacaine (NAROPIN) injection 0.5% - Perineural   15 mL - 3/19/2025 8:06:00 AM           Stable  -Continue with Metoprolol and Losartan with hold parameters.  -Monitor BP.

## 2025-05-30 NOTE — ED ADULT NURSE NOTE - NS ED NOTE ABUSE SUSPICION NEGLECT YN
TEACHING ADDENDUM  I participated in the following activities of this patients care: the medical history, the physical exam, medical decision making, the procedure.   I personally performed: supervision of the patient's care, the medical history, the physical exam.   The case was discussed with: the resident.   Procedures: I directly supervised the entire procedure.   Evaluation and management service: I agree with the evaluation and management decisions made in this patient's care.      Jeremy Pressley MD  05/30/25 0894     No